# Patient Record
Sex: MALE | Race: WHITE | NOT HISPANIC OR LATINO | Employment: OTHER | URBAN - METROPOLITAN AREA
[De-identification: names, ages, dates, MRNs, and addresses within clinical notes are randomized per-mention and may not be internally consistent; named-entity substitution may affect disease eponyms.]

---

## 2017-01-06 ENCOUNTER — OFFICE VISIT (OUTPATIENT)
Dept: INTERNAL MEDICINE | Facility: CLINIC | Age: 77
End: 2017-01-06
Payer: COMMERCIAL

## 2017-01-06 DIAGNOSIS — Z86.718 HISTORY OF DVT (DEEP VEIN THROMBOSIS): ICD-10-CM

## 2017-01-06 DIAGNOSIS — E11.9 TYPE 2 DIABETES MELLITUS WITHOUT COMPLICATION, WITHOUT LONG-TERM CURRENT USE OF INSULIN: ICD-10-CM

## 2017-01-06 DIAGNOSIS — E78.5 HYPERLIPIDEMIA WITH TARGET LDL LESS THAN 70: ICD-10-CM

## 2017-01-06 DIAGNOSIS — F41.9 ANXIETY: ICD-10-CM

## 2017-01-06 DIAGNOSIS — R41.3 MEMORY LOSS: ICD-10-CM

## 2017-01-06 DIAGNOSIS — I25.10 ASCVD (ARTERIOSCLEROTIC CARDIOVASCULAR DISEASE): Primary | ICD-10-CM

## 2017-01-06 DIAGNOSIS — J44.9 CHRONIC OBSTRUCTIVE PULMONARY DISEASE, UNSPECIFIED COPD TYPE: ICD-10-CM

## 2017-01-06 DIAGNOSIS — I48.91 ATRIAL FIBRILLATION, UNSPECIFIED TYPE: ICD-10-CM

## 2017-01-06 DIAGNOSIS — I10 BENIGN ESSENTIAL HTN: ICD-10-CM

## 2017-01-06 DIAGNOSIS — E03.8 SUBCLINICAL HYPOTHYROIDISM: ICD-10-CM

## 2017-01-06 DIAGNOSIS — Z86.711 HX OF PULMONARY EMBOLUS: ICD-10-CM

## 2017-01-06 DIAGNOSIS — Z74.09 VERY POOR MOBILITY: ICD-10-CM

## 2017-01-06 PROCEDURE — 99999 PR PBB SHADOW E&M-EST. PATIENT-LVL III: CPT | Mod: PBBFAC,,, | Performed by: NURSE PRACTITIONER

## 2017-01-06 PROCEDURE — 99308 SBSQ NF CARE LOW MDM 20: CPT | Mod: ,,, | Performed by: NURSE PRACTITIONER

## 2017-01-10 VITALS — TEMPERATURE: 98 F | DIASTOLIC BLOOD PRESSURE: 78 MMHG | HEART RATE: 71 BPM | SYSTOLIC BLOOD PRESSURE: 136 MMHG

## 2017-01-17 NOTE — PROGRESS NOTES
Mao Morse 1940 Seen at Nursing home today. DNR    Standing orders:  12/6/2016 Na 136, K 4.1, glucose 89, BUN/creat 26/1.1  11/2016 wbc 8, h/h 13/42, plt 217   Na 138, K 4.3, BUN/creat 19/1.1, glucose 120   tsh 3.4   Total chol 128, trig 157, hdl 41, ldl 55   A1C 6.3   PSA 7.4    Chief complaint: Nursing home follow-up    Vitals:    01/10/17 1329   BP: 136/78   Pulse: 71   Temp: 98 °F (36.7 °C)       Past Medical History   Diagnosis Date    Alzheimer disease     Anticoagulant long-term use     Atrial fibrillation     CAD (coronary artery disease)     COPD (chronic obstructive pulmonary disease)     Diabetes mellitus type II     Hyperlipidemia     Hypertension     MI (myocardial infarction)      x 2    Mitral valve disorder        Past Surgical History   Procedure Laterality Date    Knee surgery       left    Spine surgery       c-spine    Coronary stent placement         Review of patient's allergies indicates:  No Known Allergies    Current Outpatient Prescriptions on File Prior to Visit   Medication Sig Dispense Refill    albuterol (PROVENTIL) 2.5 mg /3 mL (0.083 %) nebulizer solution INHALE 1 VIAL PER NEBULIZER EVERY 8 HOURS  99    amlodipine (NORVASC) 5 MG tablet Take 1 tablet (5 mg total) by mouth once daily. 90 tablet 0    carvedilol (COREG) 25 MG tablet Take 1 tablet (25 mg total) by mouth 2 (two) times daily with meals. 180 tablet 0    carvedilol (COREG) 25 MG tablet TAKE ONE TABLET BY MOUTH TWICE DAILY WITH MEALS 180 tablet 0    donepezil (ARICEPT) 10 MG tablet TAKE ONE TABLET BY MOUTH EVERY EVENING 90 tablet 0    glipiZIDE (GLUCOTROL) 5 MG TR24 TAKE ONE TABLET BY MOUTH ONCE A DAY WITH BREAKFAST 90 tablet 0    hydrochlorothiazide (HYDRODIURIL) 25 MG tablet Take 1 tablet (25 mg total) by mouth once daily. 90 tablet 0    hydrochlorothiazide (HYDRODIURIL) 25 MG tablet TAKE ONE TABLET BY MOUTH ONCE A DAY 90 tablet 0    ipratropium (ATROVENT) 0.02 % nebulizer solution INHALE 1 VIAL PER  NEBULIZER EVERY 8 HOURS  99    lisinopril (PRINIVIL,ZESTRIL) 40 MG tablet Take 1 tablet (40 mg total) by mouth once daily. 90 tablet 1    lorazepam (ATIVAN) 0.5 MG tablet TAKE ONE TABLET BY MOUTH TWICE DAILY AS NEEDED FOR ANXIETY 60 tablet 5    MELATONIN ORAL Take 1 capsule by mouth once daily.      metformin (GLUCOPHAGE) 1000 MG tablet Take 1 tablet (1,000 mg total) by mouth 2 (two) times daily with meals. 180 tablet 1    metformin (GLUCOPHAGE) 1000 MG tablet TAKE ONE TABLET BY MOUTH TWICE DAILY WITH MEALS 180 tablet 0    NITROSTAT 0.4 mg SL tablet Place 1 tablet under the tongue as needed.      omeprazole (PRILOSEC) 40 MG capsule Take 1 capsule (40 mg total) by mouth every morning. 90 capsule 0    omeprazole (PRILOSEC) 40 MG capsule TAKE ONE CAPSULE BY MOUTH IN THE MORNING 90 capsule 0    potassium chloride SA (K-DUR,KLOR-CON) 20 MEQ tablet Take 1 tablet (20 mEq total) by mouth once daily. 90 tablet 1    potassium chloride SA (K-DUR,KLOR-CON) 20 MEQ tablet TAKE ONE TABLET BY MOUTH ONCE A DAY 90 tablet 0    pravastatin (PRAVACHOL) 40 MG tablet TAKE ONE TABLET BY MOUTH ONCE A DAY 90 tablet 0    valsartan (DIOVAN) 320 MG tablet Take 320 mg by mouth once daily.  11    XARELTO 20 mg Tab        No current facility-administered medications on file prior to visit.        HPI: 76 y.o. male resident of nursing home.  He is in his room. He has no complaints today. Nurse reports no problems    ROS: Significant for no complaints . Pt denies any chest pain, SOB, bowel or bladder discomfort. Pt reports eating and drinking well. Sleeping well. No complaints of pain.    PE:   APPEARANCE: Well nourished, well developed, in no acute distress.   HEAD: Normocephalic, atraumatic.  CHEST: Lungs clear to auscultation.  CARDIOVASCULAR: irreg/irreg.  ABDOMEN: Bowel sounds normal. Not distended. Soft. No tenderness or masses.  MUSCULOSKELETAL: Unremarkable. No edema  SKIN: Warm and dry.      DX:  1. ASCVD (arteriosclerotic  cardiovascular disease)     2. Anxiety     3. Chronic obstructive pulmonary disease, unspecified COPD type     4. Hyperlipidemia with target LDL less than 70     5. Benign essential HTN     6. Uncontrolled type 2 diabetes mellitus without complication, without long-term current use of insulin     7. Atrial fibrillation, unspecified type     8. Type 2 diabetes mellitus without complication, without long-term current use of insulin     9. Very poor mobility     10. Subclinical hypothyroidism     11. Memory loss     12. Hx of pulmonary embolus     13. History of DVT (deep vein thrombosis)       Past Medical History   Diagnosis Date    Alzheimer disease     Anticoagulant long-term use     Atrial fibrillation     CAD (coronary artery disease)     COPD (chronic obstructive pulmonary disease)     Diabetes mellitus type II     Hyperlipidemia     Hypertension     MI (myocardial infarction)      x 2    Mitral valve disorder        Medical decision making and plan:  F/u PSA  Cont same other meds and orders

## 2017-02-02 ENCOUNTER — OFFICE VISIT (OUTPATIENT)
Dept: INTERNAL MEDICINE | Facility: CLINIC | Age: 77
End: 2017-02-02
Payer: COMMERCIAL

## 2017-02-02 DIAGNOSIS — J44.9 CHRONIC OBSTRUCTIVE PULMONARY DISEASE, UNSPECIFIED COPD TYPE: Primary | ICD-10-CM

## 2017-02-02 PROCEDURE — 99499 UNLISTED E&M SERVICE: CPT | Mod: S$GLB,,, | Performed by: NURSE PRACTITIONER

## 2017-02-15 NOTE — PROGRESS NOTES
Not seen during this visit. He was placed on list to be seen to re qualify for O2 but nurse reports that patient does not use O2 here. POx >95%

## 2017-03-03 ENCOUNTER — OFFICE VISIT (OUTPATIENT)
Dept: INTERNAL MEDICINE | Facility: CLINIC | Age: 77
End: 2017-03-03
Payer: COMMERCIAL

## 2017-03-03 DIAGNOSIS — J44.9 CHRONIC OBSTRUCTIVE PULMONARY DISEASE, UNSPECIFIED COPD TYPE: ICD-10-CM

## 2017-03-03 DIAGNOSIS — Z86.711 HX PULMONARY EMBOLISM: ICD-10-CM

## 2017-03-03 DIAGNOSIS — E78.5 HYPERLIPIDEMIA WITH TARGET LDL LESS THAN 70: ICD-10-CM

## 2017-03-03 DIAGNOSIS — Z74.09 VERY POOR MOBILITY: ICD-10-CM

## 2017-03-03 DIAGNOSIS — I25.10 ASCVD (ARTERIOSCLEROTIC CARDIOVASCULAR DISEASE): Primary | ICD-10-CM

## 2017-03-03 DIAGNOSIS — E11.9 TYPE 2 DIABETES MELLITUS WITHOUT COMPLICATION, WITHOUT LONG-TERM CURRENT USE OF INSULIN: ICD-10-CM

## 2017-03-03 DIAGNOSIS — I10 BENIGN ESSENTIAL HTN: ICD-10-CM

## 2017-03-03 DIAGNOSIS — Z86.718 HISTORY OF DVT (DEEP VEIN THROMBOSIS): ICD-10-CM

## 2017-03-03 DIAGNOSIS — F41.9 ANXIETY: ICD-10-CM

## 2017-03-03 DIAGNOSIS — E03.8 SUBCLINICAL HYPOTHYROIDISM: ICD-10-CM

## 2017-03-03 PROCEDURE — 99309 SBSQ NF CARE MODERATE MDM 30: CPT | Mod: ,,, | Performed by: NURSE PRACTITIONER

## 2017-03-03 PROCEDURE — 99999 PR PBB SHADOW E&M-EST. PATIENT-LVL III: CPT | Mod: PBBFAC,,, | Performed by: NURSE PRACTITIONER

## 2017-03-07 VITALS
HEART RATE: 68 BPM | RESPIRATION RATE: 18 BRPM | TEMPERATURE: 97 F | SYSTOLIC BLOOD PRESSURE: 116 MMHG | DIASTOLIC BLOOD PRESSURE: 63 MMHG

## 2017-03-13 PROBLEM — Z86.711 HX PULMONARY EMBOLISM: Status: ACTIVE | Noted: 2017-03-13

## 2017-03-13 PROBLEM — Z86.718 HISTORY OF DVT (DEEP VEIN THROMBOSIS): Status: ACTIVE | Noted: 2017-03-13

## 2017-03-13 NOTE — PROGRESS NOTES
Mao Morse 1940 Seen at Nursing home today. DNR    Standing orders:  12/6/2016 Na 136, K 4.1, glucose 89, BUN/creat 26/1.1  11/2016 wbc 8, h/h 13/42, plt 217   Na 138, K 4.3, BUN/creat 19/1.1, glucose 120   tsh 3.4   Total chol 128, trig 157, hdl 41, ldl 55   A1C 6.3   PSA 7.4    Chief complaint: Nursing home follow-up    Vitals:    03/07/17 1230   BP: 116/63   Pulse: 68   Resp: 18   Temp: 97.2 °F (36.2 °C)       Past Medical History:   Diagnosis Date    Alzheimer disease     Anticoagulant long-term use     Atrial fibrillation     CAD (coronary artery disease)     COPD (chronic obstructive pulmonary disease)     Diabetes mellitus type II     Hyperlipidemia     Hypertension     MI (myocardial infarction)     x 2    Mitral valve disorder        Past Surgical History:   Procedure Laterality Date    CORONARY STENT PLACEMENT      KNEE SURGERY      left    SPINE SURGERY      c-spine       Review of patient's allergies indicates:  No Known Allergies    Current Outpatient Prescriptions on File Prior to Visit   Medication Sig Dispense Refill    albuterol (PROVENTIL) 2.5 mg /3 mL (0.083 %) nebulizer solution INHALE 1 VIAL PER NEBULIZER EVERY 8 HOURS  99    amlodipine (NORVASC) 5 MG tablet Take 1 tablet (5 mg total) by mouth once daily. 90 tablet 0    carvedilol (COREG) 25 MG tablet Take 1 tablet (25 mg total) by mouth 2 (two) times daily with meals. 180 tablet 0    donepezil (ARICEPT) 10 MG tablet TAKE ONE TABLET BY MOUTH EVERY EVENING 90 tablet 0    glipiZIDE (GLUCOTROL) 5 MG TR24 TAKE ONE TABLET BY MOUTH ONCE A DAY WITH BREAKFAST 90 tablet 0    hydrochlorothiazide (HYDRODIURIL) 25 MG tablet Take 1 tablet (25 mg total) by mouth once daily. 90 tablet 0    ipratropium (ATROVENT) 0.02 % nebulizer solution INHALE 1 VIAL PER NEBULIZER EVERY 8 HOURS  99    lisinopril (PRINIVIL,ZESTRIL) 40 MG tablet Take 1 tablet (40 mg total) by mouth once daily. 90 tablet 1    MELATONIN ORAL Take 1 capsule by mouth once  daily. 3 mg daily      metformin (GLUCOPHAGE) 1000 MG tablet TAKE ONE TABLET BY MOUTH TWICE DAILY WITH MEALS 180 tablet 0    omeprazole (PRILOSEC) 40 MG capsule Take 1 capsule (40 mg total) by mouth every morning. 90 capsule 0    potassium chloride SA (K-DUR,KLOR-CON) 20 MEQ tablet Take 1 tablet (20 mEq total) by mouth once daily. 90 tablet 1    pravastatin (PRAVACHOL) 40 MG tablet TAKE ONE TABLET BY MOUTH ONCE A DAY 90 tablet 0    XARELTO 20 mg Tab 20 mg once daily.       lorazepam (ATIVAN) 0.5 MG tablet TAKE ONE TABLET BY MOUTH TWICE DAILY AS NEEDED FOR ANXIETY 60 tablet 5    NITROSTAT 0.4 mg SL tablet Place 1 tablet under the tongue as needed.      valsartan (DIOVAN) 320 MG tablet Take 320 mg by mouth once daily.  11     No current facility-administered medications on file prior to visit.        HPI: 76 y.o. male resident of nursing home.  He is in his room. He has no complaints today. Nurse reports no problems    He was last seen by [facility] 1/16/2017    Also to note was elevated PSA     ROS: Significant for no complaints . Pt denies any chest pain, SOB, bowel or bladder discomfort. Pt reports eating and drinking well. Sleeping well. No complaints of pain.    PE:   APPEARANCE: Well nourished, well developed, in no acute distress.   HEAD: Normocephalic, atraumatic.  CHEST: Lungs clear to auscultation.  CARDIOVASCULAR: irreg/irreg.  ABDOMEN: Bowel sounds normal. Not distended. Soft. No tenderness or masses.  MUSCULOSKELETAL: Unremarkable. No edema  SKIN: Warm and dry.      DX:  1. ASCVD (arteriosclerotic cardiovascular disease)     2. Subclinical hypothyroidism     3. Very poor mobility     4. Type 2 diabetes mellitus without complication, without long-term current use of insulin     5. Benign essential HTN     6. Hyperlipidemia with target LDL less than 70     7. Chronic obstructive pulmonary disease, unspecified COPD type     8. Anxiety     9. History of DVT (deep vein thrombosis)     10. Hx pulmonary  embolism       Past Medical History:   Diagnosis Date    Alzheimer disease     Anticoagulant long-term use     Atrial fibrillation     CAD (coronary artery disease)     COPD (chronic obstructive pulmonary disease)     Diabetes mellitus type II     Hyperlipidemia     Hypertension     MI (myocardial infarction)     x 2    Mitral valve disorder        Medical decision making and plan:  F/u PSA- recheck ordered  Cont same other meds and orders

## 2017-04-06 ENCOUNTER — TELEPHONE (OUTPATIENT)
Dept: FAMILY MEDICINE | Facility: CLINIC | Age: 77
End: 2017-04-06

## 2017-04-06 NOTE — TELEPHONE ENCOUNTER
Mary calling from [facility]--pt with pulse running in the 50's last few days. Readings on 's desk for review. Please advise.

## 2017-04-27 ENCOUNTER — OFFICE VISIT (OUTPATIENT)
Dept: INTERNAL MEDICINE | Facility: CLINIC | Age: 77
End: 2017-04-27
Payer: COMMERCIAL

## 2017-04-27 VITALS
DIASTOLIC BLOOD PRESSURE: 70 MMHG | HEART RATE: 71 BPM | SYSTOLIC BLOOD PRESSURE: 137 MMHG | TEMPERATURE: 97 F | RESPIRATION RATE: 18 BRPM

## 2017-04-27 DIAGNOSIS — I48.91 ATRIAL FIBRILLATION, UNSPECIFIED TYPE: ICD-10-CM

## 2017-04-27 DIAGNOSIS — R25.1 TREMORS OF NERVOUS SYSTEM: ICD-10-CM

## 2017-04-27 DIAGNOSIS — I26.99 BILATERAL PULMONARY EMBOLISM: ICD-10-CM

## 2017-04-27 DIAGNOSIS — I10 BENIGN ESSENTIAL HTN: ICD-10-CM

## 2017-04-27 DIAGNOSIS — E03.8 SUBCLINICAL HYPOTHYROIDISM: ICD-10-CM

## 2017-04-27 DIAGNOSIS — J44.9 CHRONIC OBSTRUCTIVE PULMONARY DISEASE, UNSPECIFIED COPD TYPE: ICD-10-CM

## 2017-04-27 DIAGNOSIS — I25.10 ASCVD (ARTERIOSCLEROTIC CARDIOVASCULAR DISEASE): Primary | ICD-10-CM

## 2017-04-27 DIAGNOSIS — E78.5 HYPERLIPIDEMIA WITH TARGET LDL LESS THAN 70: ICD-10-CM

## 2017-04-27 DIAGNOSIS — R41.3 MEMORY LOSS: ICD-10-CM

## 2017-04-27 DIAGNOSIS — Z86.718 HISTORY OF DVT (DEEP VEIN THROMBOSIS): ICD-10-CM

## 2017-04-27 DIAGNOSIS — F41.9 ANXIETY: ICD-10-CM

## 2017-04-27 PROCEDURE — 99309 SBSQ NF CARE MODERATE MDM 30: CPT | Mod: ,,, | Performed by: NURSE PRACTITIONER

## 2017-04-27 PROCEDURE — 99999 PR PBB SHADOW E&M-EST. PATIENT-LVL III: CPT | Mod: PBBFAC,,, | Performed by: NURSE PRACTITIONER

## 2017-04-28 NOTE — PROGRESS NOTES
Mao Morse 1940 Seen at Nursing home today. DNR    Standing orders:  3/7/2017 PSA 7.35  12/6/2016 Na 136, K 4.1, glucose 89, BUN/creat 26/1.1  11/2016 wbc 8, h/h 13/42, plt 217   Na 138, K 4.3, BUN/creat 19/1.1, glucose 120   tsh 3.4   Total chol 128, trig 157, hdl 41, ldl 55   A1C 6.3   PSA 7.4    Chief complaint: Nursing home follow-up    Vitals:    04/27/17 1634   BP: 137/70   Pulse: 71   Resp: 18   Temp: 96.8 °F (36 °C)       Past Medical History:   Diagnosis Date    Alzheimer disease     Anticoagulant long-term use     Atrial fibrillation     CAD (coronary artery disease)     COPD (chronic obstructive pulmonary disease)     Diabetes mellitus type II     Hyperlipidemia     Hypertension     MI (myocardial infarction)     x 2    Mitral valve disorder        Past Surgical History:   Procedure Laterality Date    CORONARY STENT PLACEMENT      KNEE SURGERY      left    SPINE SURGERY      c-spine       Review of patient's allergies indicates:  No Known Allergies    Current Outpatient Prescriptions on File Prior to Visit   Medication Sig Dispense Refill    albuterol (PROVENTIL) 2.5 mg /3 mL (0.083 %) nebulizer solution INHALE 1 VIAL PER NEBULIZER EVERY 8 HOURS  99    amlodipine (NORVASC) 5 MG tablet Take 1 tablet (5 mg total) by mouth once daily. 90 tablet 0    carvedilol (COREG) 25 MG tablet Take 1 tablet (25 mg total) by mouth 2 (two) times daily with meals. 180 tablet 0    donepezil (ARICEPT) 10 MG tablet TAKE ONE TABLET BY MOUTH EVERY EVENING 90 tablet 0    glipiZIDE (GLUCOTROL) 5 MG TR24 TAKE ONE TABLET BY MOUTH ONCE A DAY WITH BREAKFAST 90 tablet 0    hydrochlorothiazide (HYDRODIURIL) 25 MG tablet Take 1 tablet (25 mg total) by mouth once daily. 90 tablet 0    ipratropium (ATROVENT) 0.02 % nebulizer solution INHALE 1 VIAL PER NEBULIZER EVERY 8 HOURS  99    lorazepam (ATIVAN) 0.5 MG tablet TAKE ONE TABLET BY MOUTH TWICE DAILY AS NEEDED FOR ANXIETY 60 tablet 5    MELATONIN ORAL Take 1  capsule by mouth once daily. 3 mg daily      metformin (GLUCOPHAGE) 1000 MG tablet TAKE ONE TABLET BY MOUTH TWICE DAILY WITH MEALS 180 tablet 0    NITROSTAT 0.4 mg SL tablet Place 1 tablet under the tongue as needed.      omeprazole (PRILOSEC) 40 MG capsule Take 1 capsule (40 mg total) by mouth every morning. 90 capsule 0    potassium chloride SA (K-DUR,KLOR-CON) 20 MEQ tablet Take 1 tablet (20 mEq total) by mouth once daily. 90 tablet 1    pravastatin (PRAVACHOL) 40 MG tablet TAKE ONE TABLET BY MOUTH ONCE A DAY 90 tablet 0    valsartan (DIOVAN) 320 MG tablet Take 320 mg by mouth once daily.  11     No current facility-administered medications on file prior to visit.        HPI: 76 y.o. male resident of nursing home.  He is in his room. He has no complaints today. Nurse reports no problems. He is on his way to [facility] for holter monitor. Denies palpitations.he does have noted in chart about low HR, coreg was decreased. HE 71 today    Also to note was elevated PSA 7.35 was sent to Dr Westbrook and discussed options. He and family will f/u with PSA and Dr Westbrook in 6 months    ROS: Significant for no complaints . Pt denies any chest pain, SOB, bowel or bladder discomfort. Pt reports eating and drinking well. Sleeping well. No complaints of pain.    PE:   APPEARANCE: Well nourished, well developed, in no acute distress.   HEAD: Normocephalic, atraumatic.  CHEST: Lungs clear to auscultation.  CARDIOVASCULAR: irreg/irreg.  ABDOMEN: Bowel sounds normal. Not distended. Soft. No tenderness or masses.  MUSCULOSKELETAL: Unremarkable. No edema  SKIN: Warm and dry.      DX:  1. ASCVD (arteriosclerotic cardiovascular disease)     2. Bilateral pulmonary embolism     3. Tremors of nervous system     4. Memory loss     5. Anxiety     6. Chronic obstructive pulmonary disease, unspecified COPD type     7. Hyperlipidemia with target LDL less than 70     8. Benign essential HTN     9. Uncontrolled type 2 diabetes mellitus without  complication, without long-term current use of insulin     10. History of DVT (deep vein thrombosis)     11. Atrial fibrillation, unspecified type     12. Subclinical hypothyroidism       Past Medical History:   Diagnosis Date    Alzheimer disease     Anticoagulant long-term use     Atrial fibrillation     CAD (coronary artery disease)     COPD (chronic obstructive pulmonary disease)     Diabetes mellitus type II     Hyperlipidemia     Hypertension     MI (myocardial infarction)     x 2    Mitral valve disorder        Medical decision making and plan:  F/u PSA with Dr Westbrook in 6 months  Valsartan ordered and d/c lisinopril per cardiology bp 137/70   Awaiting appt with Dr soto for leg weakness   Cont same other meds and orders

## 2017-06-20 ENCOUNTER — TELEPHONE (OUTPATIENT)
Dept: INTERNAL MEDICINE | Facility: CLINIC | Age: 77
End: 2017-06-20

## 2017-06-20 RX ORDER — AMIODARONE HYDROCHLORIDE 200 MG/1
200 TABLET ORAL DAILY
COMMUNITY

## 2017-06-20 RX ORDER — CARVEDILOL 12.5 MG/1
12.5 TABLET ORAL 2 TIMES DAILY
Status: ON HOLD | COMMUNITY
End: 2020-02-26 | Stop reason: HOSPADM

## 2017-06-20 RX ORDER — LEVOTHYROXINE SODIUM 25 UG/1
25 TABLET ORAL DAILY
COMMUNITY
End: 2017-09-11 | Stop reason: DRUGHIGH

## 2017-06-20 RX ORDER — LANOLIN ALCOHOL/MO/W.PET/CERES
400 CREAM (GRAM) TOPICAL 2 TIMES DAILY
COMMUNITY

## 2017-06-20 NOTE — TELEPHONE ENCOUNTER
Medication Reconciliation Completed. June 2017    Med Changes:  Coreg 25 mg BID changed to Coreg 12.5 mg BID    Lorazepam 0.5 mg tablet BID for anxiety -Discontinued

## 2017-06-22 ENCOUNTER — OFFICE VISIT (OUTPATIENT)
Dept: INTERNAL MEDICINE | Facility: CLINIC | Age: 77
End: 2017-06-22
Payer: COMMERCIAL

## 2017-06-22 DIAGNOSIS — J44.9 CHRONIC OBSTRUCTIVE PULMONARY DISEASE, UNSPECIFIED COPD TYPE: ICD-10-CM

## 2017-06-22 DIAGNOSIS — I10 BENIGN ESSENTIAL HTN: ICD-10-CM

## 2017-06-22 DIAGNOSIS — Z74.09 VERY POOR MOBILITY: ICD-10-CM

## 2017-06-22 DIAGNOSIS — I48.91 ATRIAL FIBRILLATION, UNSPECIFIED TYPE: ICD-10-CM

## 2017-06-22 DIAGNOSIS — R29.6 RECURRENT FALLS: ICD-10-CM

## 2017-06-22 DIAGNOSIS — E78.5 HYPERLIPIDEMIA WITH TARGET LDL LESS THAN 70: ICD-10-CM

## 2017-06-22 DIAGNOSIS — Z86.711 HX PULMONARY EMBOLISM: ICD-10-CM

## 2017-06-22 DIAGNOSIS — E03.8 SUBCLINICAL HYPOTHYROIDISM: ICD-10-CM

## 2017-06-22 DIAGNOSIS — Z86.718 HISTORY OF DVT (DEEP VEIN THROMBOSIS): ICD-10-CM

## 2017-06-22 DIAGNOSIS — I25.10 ASCVD (ARTERIOSCLEROTIC CARDIOVASCULAR DISEASE): ICD-10-CM

## 2017-06-22 PROCEDURE — 99999 PR PBB SHADOW E&M-EST. PATIENT-LVL III: CPT | Mod: PBBFAC,,, | Performed by: NURSE PRACTITIONER

## 2017-06-22 PROCEDURE — 99309 SBSQ NF CARE MODERATE MDM 30: CPT | Mod: ,,, | Performed by: NURSE PRACTITIONER

## 2017-06-27 VITALS
RESPIRATION RATE: 18 BRPM | DIASTOLIC BLOOD PRESSURE: 67 MMHG | SYSTOLIC BLOOD PRESSURE: 142 MMHG | HEART RATE: 73 BPM | TEMPERATURE: 97 F

## 2017-06-29 NOTE — PROGRESS NOTES
Mao Morse 1940 Seen at Nursing home today. DNR    Standing orders:  6/2017 total chol 115, trig 71, hdl 35, ldl 66   Mag 1.5   PSA 7.24   TSH 10.76   Wbc 8, h/h 12/37, plt 211   Na 138, k 3.8, bun/creat 18/0.9, glucose 72  3/7/2017 PSA 7.35  12/6/2016 Na 136, K 4.1, glucose 89, BUN/creat 26/1.1  11/2016 wbc 8, h/h 13/42, plt 217   Na 138, K 4.3, BUN/creat 19/1.1, glucose 120   tsh 3.4   Total chol 128, trig 157, hdl 41, ldl 55   A1C 6.3   PSA 7.4    Chief complaint: Nursing home follow-up    Vitals:    06/27/17 1036   BP: (!) 142/67   Pulse: 73   Resp: 18   Temp: 97.3 °F (36.3 °C)       Past Medical History:   Diagnosis Date    Alzheimer disease     Anticoagulant long-term use     Atrial fibrillation     CAD (coronary artery disease)     COPD (chronic obstructive pulmonary disease)     Diabetes mellitus type II     Hyperlipidemia     Hypertension     MI (myocardial infarction)     x 2    Mitral valve disorder        Past Surgical History:   Procedure Laterality Date    CORONARY STENT PLACEMENT      KNEE SURGERY      left    SPINE SURGERY      c-spine       Review of patient's allergies indicates:  No Known Allergies    Current Outpatient Prescriptions on File Prior to Visit   Medication Sig Dispense Refill    albuterol (PROVENTIL) 2.5 mg /3 mL (0.083 %) nebulizer solution Content of 1 vial per neb tx every 4 hours PRN SOB/ coughing (give with ipratropium)  99    amiodarone (PACERONE) 200 MG Tab Take 200 mg by mouth once daily.      amlodipine (NORVASC) 5 MG tablet Take 1 tablet (5 mg total) by mouth once daily. 90 tablet 0    carvedilol (COREG) 12.5 MG tablet Take 12.5 mg by mouth 2 (two) times daily. Notify MD if heart rate below 54      donepezil (ARICEPT) 10 MG tablet TAKE ONE TABLET BY MOUTH EVERY EVENING 90 tablet 0    glipiZIDE (GLUCOTROL) 5 MG TR24 TAKE ONE TABLET BY MOUTH ONCE A DAY WITH BREAKFAST 90 tablet 0    hydrochlorothiazide (HYDRODIURIL) 25 MG tablet Take 1 tablet (25 mg  total) by mouth once daily. 90 tablet 0    ipratropium (ATROVENT) 0.02 % nebulizer solution Content of 1 vial per neb tx every 4 hours PRN SOB/ coughing (give with albuterol)  99    levothyroxine (SYNTHROID) 25 MCG tablet Take 25 mcg by mouth once daily.      magnesium oxide (MAG-OX) 400 mg tablet Take 400 mg by mouth once daily.      MELATONIN ORAL Take 1 capsule by mouth every evening. 3 mg daily      metformin (GLUCOPHAGE) 1000 MG tablet TAKE ONE TABLET BY MOUTH TWICE DAILY WITH MEALS 180 tablet 0    NITROSTAT 0.4 mg SL tablet Place 1 tablet under the tongue as needed.      omeprazole (PRILOSEC) 40 MG capsule Take 1 capsule (40 mg total) by mouth every morning. 90 capsule 0    potassium chloride SA (K-DUR,KLOR-CON) 20 MEQ tablet Take 1 tablet (20 mEq total) by mouth once daily. 90 tablet 1    pravastatin (PRAVACHOL) 40 MG tablet TAKE ONE TABLET BY MOUTH ONCE A DAY 90 tablet 0    rivaroxaban (XARELTO) 20 mg Tab Take 20 mg by mouth daily with dinner or evening meal.      valsartan (DIOVAN) 320 MG tablet Take 320 mg by mouth once daily.  11     No current facility-administered medications on file prior to visit.        HPI: 77 y.o. male resident of nursing home.  He is in his room. He has no complaints today.     Mag 1.5  PSA 7.24  TSH 10.76      Also to note was elevated PSA 7.35 was sent to Dr Westbrook and discussed options. He and family will f/u with PSA and Dr Westbrook in 6 months    ROS: Significant for no complaints . Pt denies any chest pain, SOB, bowel or bladder discomfort. Pt reports eating and drinking well. Sleeping well. No complaints of pain.    PE:   APPEARANCE: Well nourished, well developed, in no acute distress.   HEAD: Normocephalic, atraumatic.  CHEST: Lungs clear to auscultation.  CARDIOVASCULAR: irreg/irreg.  ABDOMEN: Bowel sounds normal. Not distended. Soft. No tenderness or masses.  MUSCULOSKELETAL: Unremarkable. No edema  SKIN: Warm and dry.      DX:  1. Uncontrolled type 2 diabetes  mellitus without complication, without long-term current use of insulin     2. Benign essential HTN     3. Hyperlipidemia with target LDL less than 70     4. Chronic obstructive pulmonary disease, unspecified COPD type     5. ASCVD (arteriosclerotic cardiovascular disease)     6. Subclinical hypothyroidism     7. Very poor mobility     8. Recurrent falls     9. Atrial fibrillation, unspecified type     10. History of DVT (deep vein thrombosis)     11. Hx pulmonary embolism       Past Medical History:   Diagnosis Date    Alzheimer disease     Anticoagulant long-term use     Atrial fibrillation     CAD (coronary artery disease)     COPD (chronic obstructive pulmonary disease)     Diabetes mellitus type II     Hyperlipidemia     Hypertension     MI (myocardial infarction)     x 2    Mitral valve disorder        Medical decision making and plan:  F/u PSA with Dr Westbrook in 6 months  F/u [facility] every 6 month  Start mag ox 400mg po q day  Also started synthroid 25mcg daily and repeat tsh 6 weeks  Cont same other meds and orders

## 2017-08-15 ENCOUNTER — OFFICE VISIT (OUTPATIENT)
Dept: INTERNAL MEDICINE | Facility: CLINIC | Age: 77
End: 2017-08-15
Payer: COMMERCIAL

## 2017-08-15 DIAGNOSIS — R25.1 TREMORS OF NERVOUS SYSTEM: ICD-10-CM

## 2017-08-15 DIAGNOSIS — E78.5 HYPERLIPIDEMIA WITH TARGET LDL LESS THAN 70: ICD-10-CM

## 2017-08-15 DIAGNOSIS — I25.10 ASCVD (ARTERIOSCLEROTIC CARDIOVASCULAR DISEASE): Primary | ICD-10-CM

## 2017-08-15 DIAGNOSIS — R41.3 MEMORY LOSS: ICD-10-CM

## 2017-08-15 DIAGNOSIS — Z86.718 HISTORY OF DVT (DEEP VEIN THROMBOSIS): ICD-10-CM

## 2017-08-15 DIAGNOSIS — E11.9 TYPE 2 DIABETES MELLITUS WITHOUT COMPLICATION, WITHOUT LONG-TERM CURRENT USE OF INSULIN: ICD-10-CM

## 2017-08-15 DIAGNOSIS — I26.99 BILATERAL PULMONARY EMBOLISM: ICD-10-CM

## 2017-08-15 DIAGNOSIS — I10 BENIGN ESSENTIAL HTN: ICD-10-CM

## 2017-08-15 DIAGNOSIS — I48.91 ATRIAL FIBRILLATION, UNSPECIFIED TYPE: ICD-10-CM

## 2017-08-15 DIAGNOSIS — J44.9 CHRONIC OBSTRUCTIVE PULMONARY DISEASE, UNSPECIFIED COPD TYPE: ICD-10-CM

## 2017-08-15 DIAGNOSIS — F41.9 ANXIETY: ICD-10-CM

## 2017-08-15 DIAGNOSIS — E03.8 SUBCLINICAL HYPOTHYROIDISM: ICD-10-CM

## 2017-08-15 PROCEDURE — 99999 PR PBB SHADOW E&M-EST. PATIENT-LVL III: CPT | Mod: PBBFAC,,, | Performed by: NURSE PRACTITIONER

## 2017-08-15 PROCEDURE — 99308 SBSQ NF CARE LOW MDM 20: CPT | Mod: ,,, | Performed by: NURSE PRACTITIONER

## 2017-08-22 VITALS — RESPIRATION RATE: 20 BRPM | SYSTOLIC BLOOD PRESSURE: 142 MMHG | DIASTOLIC BLOOD PRESSURE: 67 MMHG | HEART RATE: 70 BPM

## 2017-08-22 NOTE — PROGRESS NOTES
Mao Morse 1940 Seen at Nursing home today. DNR    Standing orders:  7/27/2017 TSH 7.25  6/2017 total chol 115, trig 71, hdl 35, ldl 66   Mag 1.5   PSA 7.24   TSH 10.76 (- added synthroid 25mcg daily)   Wbc 8, h/h 12/37, plt 211   Na 138, k 3.8, bun/creat 18/0.9, glucose 72  3/7/2017 PSA 7.35  12/6/2016 Na 136, K 4.1, glucose 89, BUN/creat 26/1.1  11/2016 wbc 8, h/h 13/42, plt 217   Na 138, K 4.3, BUN/creat 19/1.1, glucose 120   tsh 3.4   Total chol 128, trig 157, hdl 41, ldl 55   A1C 6.3   PSA 7.4    Chief complaint: Nursing home follow-up    Vitals:    08/22/17 1129   BP: (!) 142/67   Pulse: 70   Resp: 20       Past Medical History:   Diagnosis Date    Alzheimer disease     Anticoagulant long-term use     Atrial fibrillation     CAD (coronary artery disease)     COPD (chronic obstructive pulmonary disease)     Diabetes mellitus type II     Hyperlipidemia     Hypertension     MI (myocardial infarction)     x 2    Mitral valve disorder        Past Surgical History:   Procedure Laterality Date    CORONARY STENT PLACEMENT      KNEE SURGERY      left    SPINE SURGERY      c-spine       Review of patient's allergies indicates:  No Known Allergies    Current Outpatient Prescriptions on File Prior to Visit   Medication Sig Dispense Refill    albuterol (PROVENTIL) 2.5 mg /3 mL (0.083 %) nebulizer solution Content of 1 vial per neb tx every 4 hours PRN SOB/ coughing (give with ipratropium)  99    amiodarone (PACERONE) 200 MG Tab Take 200 mg by mouth once daily.      amlodipine (NORVASC) 5 MG tablet Take 1 tablet (5 mg total) by mouth once daily. 90 tablet 0    carvedilol (COREG) 12.5 MG tablet Take 12.5 mg by mouth 2 (two) times daily. Notify MD if heart rate below 54      donepezil (ARICEPT) 10 MG tablet TAKE ONE TABLET BY MOUTH EVERY EVENING 90 tablet 0    glipiZIDE (GLUCOTROL) 5 MG TR24 TAKE ONE TABLET BY MOUTH ONCE A DAY WITH BREAKFAST 90 tablet 0    hydrochlorothiazide (HYDRODIURIL) 25 MG  tablet Take 1 tablet (25 mg total) by mouth once daily. 90 tablet 0    ipratropium (ATROVENT) 0.02 % nebulizer solution Content of 1 vial per neb tx every 4 hours PRN SOB/ coughing (give with albuterol)  99    levothyroxine (SYNTHROID) 25 MCG tablet Take 25 mcg by mouth once daily.      magnesium oxide (MAG-OX) 400 mg tablet Take 400 mg by mouth once daily.      MELATONIN ORAL Take 1 capsule by mouth every evening. 3 mg daily      metformin (GLUCOPHAGE) 1000 MG tablet TAKE ONE TABLET BY MOUTH TWICE DAILY WITH MEALS 180 tablet 0    NITROSTAT 0.4 mg SL tablet Place 1 tablet under the tongue as needed.      omeprazole (PRILOSEC) 40 MG capsule Take 1 capsule (40 mg total) by mouth every morning. 90 capsule 0    potassium chloride SA (K-DUR,KLOR-CON) 20 MEQ tablet Take 1 tablet (20 mEq total) by mouth once daily. 90 tablet 1    pravastatin (PRAVACHOL) 40 MG tablet TAKE ONE TABLET BY MOUTH ONCE A DAY 90 tablet 0    rivaroxaban (XARELTO) 20 mg Tab Take 20 mg by mouth daily with dinner or evening meal.      valsartan (DIOVAN) 320 MG tablet Take 320 mg by mouth once daily.  11     No current facility-administered medications on file prior to visit.        HPI: 77 y.o. male resident of nursing home.  He is in his room. He has no complaints today.     Last he was seen labs revealed elevated tsh and synthroid was started at 25mcg daily, repeat TSH still elevated 7.25    Also recently saw Dr Woods and he referred him to Dr Armando for bilateral lower ext weakness    ROS: Significant for no complaints . Pt denies any chest pain, SOB, bowel or bladder discomfort. Pt reports eating and drinking well. Sleeping well. No complaints of pain.    PE:   APPEARANCE: Well nourished, well developed, in no acute distress.   HEAD: Normocephalic, atraumatic.  CHEST: Lungs clear to auscultation.  CARDIOVASCULAR: irreg/irreg.  ABDOMEN: Bowel sounds normal. Not distended. Soft. No tenderness or masses.  MUSCULOSKELETAL: Unremarkable.  No edema  SKIN: Warm and dry.      DX:  1. ASCVD (arteriosclerotic cardiovascular disease)     2. Bilateral pulmonary embolism     3. Tremors of nervous system     4. Memory loss     5. Anxiety     6. Chronic obstructive pulmonary disease, unspecified COPD type     7. Hyperlipidemia with target LDL less than 70     8. Benign essential HTN     9. Uncontrolled type 2 diabetes mellitus without complication, without long-term current use of insulin     10. History of DVT (deep vein thrombosis)     11. Atrial fibrillation, unspecified type     12. Type 2 diabetes mellitus without complication, without long-term current use of insulin     13. Subclinical hypothyroidism       Past Medical History:   Diagnosis Date    Alzheimer disease     Anticoagulant long-term use     Atrial fibrillation     CAD (coronary artery disease)     COPD (chronic obstructive pulmonary disease)     Diabetes mellitus type II     Hyperlipidemia     Hypertension     MI (myocardial infarction)     x 2    Mitral valve disorder        Medical decision making and plan:  F/u PSA with Dr Westbrook in 6 months  F/u CIS every 6 month  F/u Dr Armando per Dr Woods rec for bilateral lower ext weakness  Also Increased synthroid to 50mcg daily and repeat tsh 6 weeks  Cont same other meds and orders

## 2017-09-11 ENCOUNTER — OFFICE VISIT (OUTPATIENT)
Dept: NEUROLOGY | Facility: CLINIC | Age: 77
End: 2017-09-11
Payer: COMMERCIAL

## 2017-09-11 ENCOUNTER — HOSPITAL ENCOUNTER (OUTPATIENT)
Dept: RADIOLOGY | Facility: HOSPITAL | Age: 77
Discharge: HOME | End: 2017-09-11
Attending: PSYCHIATRY & NEUROLOGY
Payer: COMMERCIAL

## 2017-09-11 VITALS
SYSTOLIC BLOOD PRESSURE: 112 MMHG | DIASTOLIC BLOOD PRESSURE: 68 MMHG | HEIGHT: 71 IN | RESPIRATION RATE: 16 BRPM | HEART RATE: 66 BPM

## 2017-09-11 DIAGNOSIS — M25.551 RIGHT HIP PAIN: ICD-10-CM

## 2017-09-11 DIAGNOSIS — R29.898 WEAKNESS OF BOTH LOWER EXTREMITIES: ICD-10-CM

## 2017-09-11 DIAGNOSIS — R26.9 ABNORMAL GAIT: ICD-10-CM

## 2017-09-11 DIAGNOSIS — R26.9 ABNORMAL GAIT: Primary | ICD-10-CM

## 2017-09-11 DIAGNOSIS — I48.91 ATRIAL FIBRILLATION, UNSPECIFIED TYPE: ICD-10-CM

## 2017-09-11 PROCEDURE — 1126F AMNT PAIN NOTED NONE PRSNT: CPT | Mod: S$GLB,,, | Performed by: PSYCHIATRY & NEUROLOGY

## 2017-09-11 PROCEDURE — 73502 X-RAY EXAM HIP UNI 2-3 VIEWS: CPT | Mod: 26,RT,, | Performed by: RADIOLOGY

## 2017-09-11 PROCEDURE — 3078F DIAST BP <80 MM HG: CPT | Mod: S$GLB,,, | Performed by: PSYCHIATRY & NEUROLOGY

## 2017-09-11 PROCEDURE — 99203 OFFICE O/P NEW LOW 30 MIN: CPT | Mod: S$GLB,,, | Performed by: PSYCHIATRY & NEUROLOGY

## 2017-09-11 PROCEDURE — 72100 X-RAY EXAM L-S SPINE 2/3 VWS: CPT | Mod: 26,,, | Performed by: RADIOLOGY

## 2017-09-11 PROCEDURE — 3074F SYST BP LT 130 MM HG: CPT | Mod: S$GLB,,, | Performed by: PSYCHIATRY & NEUROLOGY

## 2017-09-11 PROCEDURE — 1159F MED LIST DOCD IN RCRD: CPT | Mod: S$GLB,,, | Performed by: PSYCHIATRY & NEUROLOGY

## 2017-09-11 PROCEDURE — 73502 X-RAY EXAM HIP UNI 2-3 VIEWS: CPT | Mod: TC,RT

## 2017-09-11 PROCEDURE — 72100 X-RAY EXAM L-S SPINE 2/3 VWS: CPT | Mod: TC

## 2017-09-11 PROCEDURE — 99999 PR PBB SHADOW E&M-EST. PATIENT-LVL III: CPT | Mod: PBBFAC,,, | Performed by: PSYCHIATRY & NEUROLOGY

## 2017-09-11 RX ORDER — LEVOTHYROXINE SODIUM 50 UG/1
50 TABLET ORAL DAILY
COMMUNITY

## 2017-09-11 NOTE — LETTER
September 11, 2017      Alyssia Byrd, NAWAF  [address] [address]           [facility]  [address] [address]  Phone: [phone]  Fax: [phone]          Patient: Mao Morse   MR Number: [id number]   YOB: 1940   Date of Visit: 9/11/2017       Dear Alyssia Byrd:    Thank you for referring Mao Morse to me for evaluation. Attached you will find relevant portions of my assessment and plan of care.    If you have questions, please do not hesitate to call me. I look forward to following Mao Morse along with you.    Sincerely,    Gray Armando MD    Enclosure  CC:  No Recipients    If you would like to receive this communication electronically, please contact [email or web address] or [phone] to request more information on [facility] access.    For providers and/or their staff who would like to refer a patient to [facility], please contact us through our one-stop-shop provider referral line, [facility] , at [phone].    If you feel you have received this communication in error or would no longer like to receive these types of communications, please e-mail [email or web address]

## 2017-09-11 NOTE — PROGRESS NOTES
Consult from Dr Woods    HPI: Mao Morse is a 77 y.o. male with Alzheimer's disease.  The patient is sent from his cardiologist regarding his leg weakness.   The patient is known to me and was last seen in 2014 for a long history of right arm tremor (post severe head trauma age 8) then with several months of left arm tremor with activities and worse right arm tremor. Prior CVA and history of Alzheimer's reported at that time.  At that time, I thought seroquel use could be worsening or causing his tremor and d/c'ed the medication and was to consider a medication trial for tremor if he did not improve.  He never returned for follow up and has been placed in the nursing home since. He did not complete MRI brain at that time, and no records from his prior CVA were ever sent.   Leg weakness, per the patient started a few years ago. He reports some trauma to the a few spine years ago (in the lower back)- the patient states he fell a few times then. He reports pain in the right lower back which radiates to the right hip. He has no numbness in the legs.   He stopped walking over a year ago.   His memory is good to him.   He states he also has chronic left sided weakness which contributes to his poor gait.   Patient is in Physical therapy for this  He states his tremor is back to baseline. No left hand tremor now  He is now on xeralto for stroke prevention  He states he has independent right hip pain.     ROS      I have reviewed all of this patient's past medical and surgical histories as well as family and social histories and active allergies and medications as documented in the electronic medical record.        Exam:  Gen Appearance, well developed/nourished in no apparent distress  CV: 2+ distal pulses with no edema or swelling  Neuro:  MS: Awake, alert, oriented to place, person, time he knows the exact date situation. Sustains attention. Recent recall only mildly impaired (he can recall going through prior  MRIs)/remote memory intact, Language is full to spontaneous speech/repetition/naming/comprehension. Fund of Knowledge is full  Judgement good   CN: Optic discs are flat with normal vasculature, PERRL, Extraoccular movements and visual fields are full. Normal facial sensation and strength, Hearing symmetric, Tongue and Palate are midline and strong. Shoulder Shrug symmetric and strong.  Motor: Normal bulk, tone, no abnormal movements. 5/5 strength bilateral upper/lower extremities with 1+ reflexes and no clonus  Sensory: symmetric to temp and vibration but may be decreased in the legs due to diabetes. Romberg negative  Cerebellar: Finger-nose,Heal-shin, Rapid alternating movements intact  Gait: good rise from chair but there is deconditioned appearing weakness in the legs    Imagin2016 CT head: No evidence of acute hemorrhage, mass or mass effect. If there is additional clinical concern for acute ischemia, MRI with diffusion-weighted imaging could be obtained.  But note:  Remote right parietal, left frontal and right occipital infarcts are suspected.  Bilateral lacunar infarcts are also noted.   Labs:  TSH, B12, RPR normal      Assessment/Plan: Mao Morse is a 77 y.o. male known to me for Alzheimer's disease, tremor of the right arm since childhood (post trauma) but worsening tremor in the right arm with new left arm tremor in . Patient also has a know prior history of CVA with multiple territory vascular infarcts on imaging due to afib    I recommend:   1. Seroquel was stopped in  and he is back to his right hand tremor only, chronic since trauma.   2. Check xray Lumbar spine and right hip. Patient states he does not want aggressive work up or treatment such as MRI or EMG. He understands we could be missing a treatable lesion. Patient displays good insight and judgement.   3. Leg weakness is likely multi-factorial and may be due to deconditioning, lumbar radicular disease and his prior CVA and head  injury. He could also have an element of neuropathy in the legs.   4. He is in Physical therapy now to help with leg weakness  5. He will continue NOAC per cardiology for stroke prevention given his afib  6. Continue aricept for memory loss  RTC in 4 months- he understand the importance of follow up to ensure no worsening.   CC: Dr Woods  Also cc: [facility]

## 2017-10-10 ENCOUNTER — OFFICE VISIT (OUTPATIENT)
Dept: INTERNAL MEDICINE | Facility: CLINIC | Age: 77
End: 2017-10-10
Payer: COMMERCIAL

## 2017-10-10 VITALS
SYSTOLIC BLOOD PRESSURE: 142 MMHG | TEMPERATURE: 97 F | DIASTOLIC BLOOD PRESSURE: 67 MMHG | RESPIRATION RATE: 20 BRPM | HEART RATE: 72 BPM

## 2017-10-10 DIAGNOSIS — R29.6 RECURRENT FALLS: ICD-10-CM

## 2017-10-10 DIAGNOSIS — Z86.711 HX PULMONARY EMBOLISM: ICD-10-CM

## 2017-10-10 DIAGNOSIS — F41.9 ANXIETY: ICD-10-CM

## 2017-10-10 DIAGNOSIS — R25.1 TREMORS OF NERVOUS SYSTEM: ICD-10-CM

## 2017-10-10 DIAGNOSIS — I25.10 ASCVD (ARTERIOSCLEROTIC CARDIOVASCULAR DISEASE): ICD-10-CM

## 2017-10-10 DIAGNOSIS — I48.91 ATRIAL FIBRILLATION, UNSPECIFIED TYPE: ICD-10-CM

## 2017-10-10 DIAGNOSIS — J44.9 CHRONIC OBSTRUCTIVE PULMONARY DISEASE, UNSPECIFIED COPD TYPE: ICD-10-CM

## 2017-10-10 DIAGNOSIS — I10 BENIGN ESSENTIAL HTN: ICD-10-CM

## 2017-10-10 DIAGNOSIS — Z86.718 HISTORY OF DVT (DEEP VEIN THROMBOSIS): ICD-10-CM

## 2017-10-10 DIAGNOSIS — R41.3 MEMORY LOSS: ICD-10-CM

## 2017-10-10 DIAGNOSIS — E78.5 HYPERLIPIDEMIA WITH TARGET LDL LESS THAN 70: ICD-10-CM

## 2017-10-10 DIAGNOSIS — E03.8 SUBCLINICAL HYPOTHYROIDISM: ICD-10-CM

## 2017-10-10 PROCEDURE — 99999 PR PBB SHADOW E&M-EST. PATIENT-LVL III: CPT | Mod: PBBFAC,,, | Performed by: NURSE PRACTITIONER

## 2017-10-10 PROCEDURE — 99309 SBSQ NF CARE MODERATE MDM 30: CPT | Mod: ,,, | Performed by: NURSE PRACTITIONER

## 2017-10-17 NOTE — PROGRESS NOTES
Mao Morse 1940 Seen at Nursing home today. DNR    Standing orders:  9/1/2017 tsh 4.7 (7/2017- was 7.25 and we increase synthroid from 25 to 50mcg daily)  7/27/2017 TSH 7.25  6/2017 total chol 115, trig 71, hdl 35, ldl 66   Mag 1.5   PSA 7.24   TSH 10.76 (- added synthroid 25mcg daily)   Wbc 8, h/h 12/37, plt 211   Na 138, k 3.8, bun/creat 18/0.9, glucose 72  3/7/2017 PSA 7.35  12/6/2016 Na 136, K 4.1, glucose 89, BUN/creat 26/1.1  11/2016 wbc 8, h/h 13/42, plt 217   Na 138, K 4.3, BUN/creat 19/1.1, glucose 120   tsh 3.4   Total chol 128, trig 157, hdl 41, ldl 55   A1C 6.3   PSA 7.4    Chief complaint: Nursing home follow-up    Vitals:    10/10/17 1329   BP: (!) 142/67   Pulse: 72   Resp: 20   Temp: 96.8 °F (36 °C)       Past Medical History:   Diagnosis Date    Alzheimer disease     Anticoagulant long-term use     Atrial fibrillation     CAD (coronary artery disease)     COPD (chronic obstructive pulmonary disease)     Diabetes mellitus type II     Hyperlipidemia     Hypertension     MI (myocardial infarction)     x 2    Mitral valve disorder        Past Surgical History:   Procedure Laterality Date    CORONARY STENT PLACEMENT      KNEE SURGERY      left    SPINE SURGERY      c-spine       Review of patient's allergies indicates:  No Known Allergies    Current Outpatient Prescriptions on File Prior to Visit   Medication Sig Dispense Refill    albuterol (PROVENTIL) 2.5 mg /3 mL (0.083 %) nebulizer solution Content of 1 vial per neb tx every 4 hours PRN SOB/ coughing (give with ipratropium)  99    amiodarone (PACERONE) 200 MG Tab Take 200 mg by mouth once daily.      amlodipine (NORVASC) 5 MG tablet Take 1 tablet (5 mg total) by mouth once daily. 90 tablet 0    carvedilol (COREG) 12.5 MG tablet Take 12.5 mg by mouth 2 (two) times daily. Notify MD if heart rate below 54      donepezil (ARICEPT) 10 MG tablet TAKE ONE TABLET BY MOUTH EVERY EVENING 90 tablet 0    glipiZIDE (GLUCOTROL) 5 MG TR24  TAKE ONE TABLET BY MOUTH ONCE A DAY WITH BREAKFAST 90 tablet 0    hydrochlorothiazide (HYDRODIURIL) 25 MG tablet Take 1 tablet (25 mg total) by mouth once daily. 90 tablet 0    ipratropium (ATROVENT) 0.02 % nebulizer solution Content of 1 vial per neb tx every 4 hours PRN SOB/ coughing (give with albuterol)  99    levothyroxine (SYNTHROID) 50 MCG tablet Take 50 mcg by mouth once daily.      magnesium oxide (MAG-OX) 400 mg tablet Take 400 mg by mouth once daily.      MELATONIN ORAL Take 1 capsule by mouth every evening. 3 mg daily      metformin (GLUCOPHAGE) 1000 MG tablet TAKE ONE TABLET BY MOUTH TWICE DAILY WITH MEALS 180 tablet 0    NITROSTAT 0.4 mg SL tablet Place 1 tablet under the tongue as needed.      omeprazole (PRILOSEC) 40 MG capsule Take 1 capsule (40 mg total) by mouth every morning. 90 capsule 0    potassium chloride SA (K-DUR,KLOR-CON) 20 MEQ tablet Take 1 tablet (20 mEq total) by mouth once daily. 90 tablet 1    pravastatin (PRAVACHOL) 40 MG tablet TAKE ONE TABLET BY MOUTH ONCE A DAY 90 tablet 0    rivaroxaban (XARELTO) 20 mg Tab Take 20 mg by mouth daily with dinner or evening meal.      valsartan (DIOVAN) 320 MG tablet Take 320 mg by mouth once daily.  11     No current facility-administered medications on file prior to visit.        HPI: 77 y.o. male resident of nursing home.  He is in his room. He has no complaints today except that his room mate takes up most of the spce in his room which makes it difficult for him to get in and out of bed.     Also recently saw Dr Woods and he referred him to Dr Armando for bilateral lower ext weakness 9/11/17 Saw Dr Armando for debility and hip pain.  X rays show advance deg changes of the hip and scoliosis and severe deg changes along with bine spur and disc space narrowing of the lumbar spine. PT was ordered and may refer to ortho if pt would like if no relief with PT.     ROS: Significant for no medical complaints . Pt denies any chest pain,  SOB, bowel or bladder discomfort. Pt reports eating and drinking well. Sleeping well. No complaints of pain.    PE:   APPEARANCE: Well nourished, well developed, in no acute distress.   HEAD: Normocephalic, atraumatic.  CHEST: Lungs clear to auscultation.  CARDIOVASCULAR: irreg/irreg.  ABDOMEN: Bowel sounds normal. Not distended. Soft. No tenderness or masses.  MUSCULOSKELETAL: Unremarkable. No edema  SKIN: Warm and dry.      DX:  1. Uncontrolled type 2 diabetes mellitus without complication, without long-term current use of insulin     2. Benign essential HTN     3. Hyperlipidemia with target LDL less than 70     4. Chronic obstructive pulmonary disease, unspecified COPD type     5. Anxiety     6. Memory loss     7. Tremors of nervous system     8. ASCVD (arteriosclerotic cardiovascular disease)     9. Subclinical hypothyroidism     10. Recurrent falls     11. Atrial fibrillation, unspecified type     12. History of DVT (deep vein thrombosis)     13. Hx pulmonary embolism       Past Medical History:   Diagnosis Date    Alzheimer disease     Anticoagulant long-term use     Atrial fibrillation     CAD (coronary artery disease)     COPD (chronic obstructive pulmonary disease)     Diabetes mellitus type II     Hyperlipidemia     Hypertension     MI (myocardial infarction)     x 2    Mitral valve disorder        Medical decision making and plan:  F/u PSA with Dr Westbrook in 6 months  F/u CIS every 6 month  F/u Dr Armando per Dr Woods rec for bilateral lower ext weakness; Cont with PT  Also Increased synthroid to 50mcg daily and repeat tsh WNL cont current dose of 50mcg daily    Cont same other meds and orders

## 2017-11-29 ENCOUNTER — TELEPHONE (OUTPATIENT)
Dept: FAMILY MEDICINE | Facility: CLINIC | Age: 77
End: 2017-11-29

## 2017-11-29 NOTE — TELEPHONE ENCOUNTER
Spoke to vida.  She stated pt hit his R arm on anothers pts bed and a hematoma popped up with in a few hours.  The next day the arm began to swell up and it is larger than his L arm.  Pt does not complain of any pain  And does not have any fever.  Nurse is calling to see if you can send in some mupirocin ointment, she states his arm looks like it is in the beginning of cellulitis.   Please advise, thank you

## 2017-11-29 NOTE — TELEPHONE ENCOUNTER
----- Message from Taylor Cárdenas MA sent at 2017  3:41 PM CST -----  Contact: Lorraien- Tanesha Morse  MRN: [id number]  : 1940  PCP: Elsa Unger  Home Phone      [phone]  Work Phone      Not on file.  Mobile          [phone]      MESSAGE: aTnesha from [employer] would like to speak to nurse about patient's arm. Please call and advise.  Phone: [phone]

## 2017-11-30 RX ORDER — MUPIROCIN 20 MG/G
OINTMENT TOPICAL 2 TIMES DAILY
Qty: 22 G | Refills: 2 | Status: SHIPPED | OUTPATIENT
Start: 2017-11-30 | End: 2017-12-10

## 2017-12-05 ENCOUNTER — OFFICE VISIT (OUTPATIENT)
Dept: INTERNAL MEDICINE | Facility: CLINIC | Age: 77
End: 2017-12-05
Payer: COMMERCIAL

## 2017-12-05 VITALS
TEMPERATURE: 98 F | SYSTOLIC BLOOD PRESSURE: 136 MMHG | RESPIRATION RATE: 19 BRPM | DIASTOLIC BLOOD PRESSURE: 76 MMHG | HEART RATE: 76 BPM

## 2017-12-05 DIAGNOSIS — E03.8 SUBCLINICAL HYPOTHYROIDISM: ICD-10-CM

## 2017-12-05 DIAGNOSIS — F41.9 ANXIETY: ICD-10-CM

## 2017-12-05 DIAGNOSIS — I10 BENIGN ESSENTIAL HTN: ICD-10-CM

## 2017-12-05 DIAGNOSIS — I25.10 ASCVD (ARTERIOSCLEROTIC CARDIOVASCULAR DISEASE): ICD-10-CM

## 2017-12-05 DIAGNOSIS — I48.91 ATRIAL FIBRILLATION, UNSPECIFIED TYPE: ICD-10-CM

## 2017-12-05 DIAGNOSIS — J44.9 CHRONIC OBSTRUCTIVE PULMONARY DISEASE, UNSPECIFIED COPD TYPE: ICD-10-CM

## 2017-12-05 DIAGNOSIS — E78.5 HYPERLIPIDEMIA WITH TARGET LDL LESS THAN 70: ICD-10-CM

## 2017-12-05 DIAGNOSIS — R25.1 TREMORS OF NERVOUS SYSTEM: ICD-10-CM

## 2017-12-05 PROCEDURE — 99999 PR PBB SHADOW E&M-EST. PATIENT-LVL III: CPT | Mod: PBBFAC,,, | Performed by: NURSE PRACTITIONER

## 2017-12-05 PROCEDURE — 99308 SBSQ NF CARE LOW MDM 20: CPT | Mod: ,,, | Performed by: NURSE PRACTITIONER

## 2018-01-23 ENCOUNTER — OFFICE VISIT (OUTPATIENT)
Dept: INTERNAL MEDICINE | Facility: CLINIC | Age: 78
End: 2018-01-23
Payer: MEDICARE

## 2018-01-23 VITALS — SYSTOLIC BLOOD PRESSURE: 153 MMHG | RESPIRATION RATE: 18 BRPM | DIASTOLIC BLOOD PRESSURE: 62 MMHG | HEART RATE: 75 BPM

## 2018-01-23 DIAGNOSIS — J44.9 CHRONIC OBSTRUCTIVE PULMONARY DISEASE, UNSPECIFIED COPD TYPE: ICD-10-CM

## 2018-01-23 DIAGNOSIS — E78.5 HYPERLIPIDEMIA WITH TARGET LDL LESS THAN 70: ICD-10-CM

## 2018-01-23 DIAGNOSIS — E03.8 SUBCLINICAL HYPOTHYROIDISM: ICD-10-CM

## 2018-01-23 DIAGNOSIS — E11.9 TYPE 2 DIABETES MELLITUS WITHOUT COMPLICATION, WITHOUT LONG-TERM CURRENT USE OF INSULIN: ICD-10-CM

## 2018-01-23 DIAGNOSIS — R29.6 RECURRENT FALLS: ICD-10-CM

## 2018-01-23 DIAGNOSIS — Z74.09 VERY POOR MOBILITY: ICD-10-CM

## 2018-01-23 DIAGNOSIS — R25.1 TREMORS OF NERVOUS SYSTEM: ICD-10-CM

## 2018-01-23 DIAGNOSIS — I10 BENIGN ESSENTIAL HTN: ICD-10-CM

## 2018-01-23 DIAGNOSIS — R41.3 MEMORY LOSS: ICD-10-CM

## 2018-01-23 DIAGNOSIS — I48.91 ATRIAL FIBRILLATION, UNSPECIFIED TYPE: ICD-10-CM

## 2018-01-23 DIAGNOSIS — I25.10 ASCVD (ARTERIOSCLEROTIC CARDIOVASCULAR DISEASE): ICD-10-CM

## 2018-01-23 DIAGNOSIS — F41.9 ANXIETY: ICD-10-CM

## 2018-01-23 PROCEDURE — 99999 PR PBB SHADOW E&M-EST. PATIENT-LVL III: CPT | Mod: PBBFAC,,, | Performed by: NURSE PRACTITIONER

## 2018-01-23 PROCEDURE — 99308 SBSQ NF CARE LOW MDM 20: CPT | Mod: ICN,,, | Performed by: NURSE PRACTITIONER

## 2018-01-23 NOTE — PROGRESS NOTES
Mao Morse 1940 Seen at Nursing home today. DNR    Standing orders:  12/7/17 glucose 82, Na 140, K 4.6, BUN/creat 20.0.9  9/1/2017 tsh 4.7 (7/2017- was 7.25 and we increase synthroid from 25 to 50mcg daily)  7/27/2017 TSH 7.25  6/2017 total chol 115, trig 71, hdl 35, ldl 66   Mag 1.5   PSA 7.24   TSH 10.76 (- added synthroid 25mcg daily)   Wbc 8, h/h 12/37, plt 211   Na 138, k 3.8, bun/creat 18/0.9, glucose 72  3/7/2017 PSA 7.35  12/6/2016 Na 136, K 4.1, glucose 89, BUN/creat 26/1.1  11/2016 wbc 8, h/h 13/42, plt 217   Na 138, K 4.3, BUN/creat 19/1.1, glucose 120   tsh 3.4   Total chol 128, trig 157, hdl 41, ldl 55   A1C 6.3   PSA 7.4    Chief complaint: Nursing home follow-up    Vitals:    01/23/18 1020   BP: (!) 153/62   Pulse: 75   Resp: 18       Past Medical History:   Diagnosis Date    Alzheimer disease     Anticoagulant long-term use     Atrial fibrillation     CAD (coronary artery disease)     COPD (chronic obstructive pulmonary disease)     Diabetes mellitus type II     Hyperlipidemia     Hypertension     MI (myocardial infarction)     x 2    Mitral valve disorder        Past Surgical History:   Procedure Laterality Date    CORONARY STENT PLACEMENT      KNEE SURGERY      left    SPINE SURGERY      c-spine       Review of patient's allergies indicates:  No Known Allergies    Current Outpatient Prescriptions on File Prior to Visit   Medication Sig Dispense Refill    albuterol (PROVENTIL) 2.5 mg /3 mL (0.083 %) nebulizer solution Content of 1 vial per neb tx every 4 hours PRN SOB/ coughing (give with ipratropium)  99    amiodarone (PACERONE) 200 MG Tab Take 200 mg by mouth once daily.      amlodipine (NORVASC) 5 MG tablet Take 1 tablet (5 mg total) by mouth once daily. 90 tablet 0    carvedilol (COREG) 12.5 MG tablet Take 12.5 mg by mouth 2 (two) times daily. Notify MD if heart rate below 54      donepezil (ARICEPT) 10 MG tablet TAKE ONE TABLET BY MOUTH EVERY EVENING 90 tablet 0     glipiZIDE (GLUCOTROL) 5 MG TR24 TAKE ONE TABLET BY MOUTH ONCE A DAY WITH BREAKFAST 90 tablet 0    hydrochlorothiazide (HYDRODIURIL) 25 MG tablet Take 1 tablet (25 mg total) by mouth once daily. 90 tablet 0    ipratropium (ATROVENT) 0.02 % nebulizer solution Content of 1 vial per neb tx every 4 hours PRN SOB/ coughing (give with albuterol)  99    levothyroxine (SYNTHROID) 50 MCG tablet Take 50 mcg by mouth once daily.      magnesium oxide (MAG-OX) 400 mg tablet Take 400 mg by mouth once daily.      MELATONIN ORAL Take 1 capsule by mouth every evening. 3 mg daily      metformin (GLUCOPHAGE) 1000 MG tablet TAKE ONE TABLET BY MOUTH TWICE DAILY WITH MEALS 180 tablet 0    NITROSTAT 0.4 mg SL tablet Place 1 tablet under the tongue as needed.      omeprazole (PRILOSEC) 40 MG capsule Take 1 capsule (40 mg total) by mouth every morning. 90 capsule 0    potassium chloride SA (K-DUR,KLOR-CON) 20 MEQ tablet Take 1 tablet (20 mEq total) by mouth once daily. 90 tablet 1    pravastatin (PRAVACHOL) 40 MG tablet TAKE ONE TABLET BY MOUTH ONCE A DAY 90 tablet 0    rivaroxaban (XARELTO) 20 mg Tab Take 20 mg by mouth daily with dinner or evening meal.      valsartan (DIOVAN) 320 MG tablet Take 320 mg by mouth once daily.  11     No current facility-administered medications on file prior to visit.        HPI: 77 y.o. male resident of nursing home.  He is in his room. He was d/kelly from PT 10/18 but restarted 11/9. He needs this to keep what little independence he has. Evaluated by Dr Armando for his lower ext weakness and this was rec per her. Ortho in future if needed. He also sees Dr Armando every 4 months last seen 9/11/17.     1/10/18 treated with tamiflu    Last saw Dr Westbrook for elevated PSA 10/25 and he rec a prostate biopsy. The nurses tell me this is being scheduled.   12/14 I wrote orders to be sure his f/us were scheduled with Dr Westbrook, CIS joel Armando      ROS: Significant for no complaints . Pt denies any chest  pain, SOB, bowel or bladder discomfort. Pt reports eating and drinking well. Sleeping well. No complaints of pain.    PE:   APPEARANCE: Well nourished, well developed, in no acute distress.   HEAD: Normocephalic, atraumatic.  CHEST: Lungs clear to auscultation.  CARDIOVASCULAR: irreg/irreg.  ABDOMEN: Bowel sounds normal. Not distended. Soft. No tenderness or masses.  MUSCULOSKELETAL: Unremarkable. 1+bilateral edema lower ext  SKIN: Warm and dry.      DX:  1. Uncontrolled type 2 diabetes mellitus without complication, without long-term current use of insulin     2. Benign essential HTN     3. Hyperlipidemia with target LDL less than 70     4. Chronic obstructive pulmonary disease, unspecified COPD type     5. Anxiety     6. Memory loss     7. Tremors of nervous system     8. ASCVD (arteriosclerotic cardiovascular disease)     9. Subclinical hypothyroidism     10. Very poor mobility     11. Recurrent falls     12. Type 2 diabetes mellitus without complication, without long-term current use of insulin     13. Atrial fibrillation, unspecified type       Past Medical History:   Diagnosis Date    Alzheimer disease     Anticoagulant long-term use     Atrial fibrillation     CAD (coronary artery disease)     COPD (chronic obstructive pulmonary disease)     Diabetes mellitus type II     Hyperlipidemia     Hypertension     MI (myocardial infarction)     x 2    Mitral valve disorder        Medical decision making and plan:  F/u  Dr Westbrook for prostate bx  F/u CIS every 6 month  F/u Dr Armando per Dr Woods rec for bilateral lower ext weakness; Cont with PT  Also Increased synthroid to 50mcg daily and repeat tsh WNL cont current dose of 50mcg daily    Cont same other meds and orders

## 2018-02-15 ENCOUNTER — OFFICE VISIT (OUTPATIENT)
Dept: NEUROLOGY | Facility: CLINIC | Age: 78
End: 2018-02-15
Payer: COMMERCIAL

## 2018-02-15 VITALS
RESPIRATION RATE: 16 BRPM | DIASTOLIC BLOOD PRESSURE: 72 MMHG | HEIGHT: 69 IN | HEART RATE: 68 BPM | SYSTOLIC BLOOD PRESSURE: 130 MMHG

## 2018-02-15 DIAGNOSIS — M16.12 OSTEOARTHRITIS OF LEFT HIP, UNSPECIFIED OSTEOARTHRITIS TYPE: ICD-10-CM

## 2018-02-15 DIAGNOSIS — R29.898 LEFT LEG WEAKNESS: ICD-10-CM

## 2018-02-15 DIAGNOSIS — M51.36 LUMBAR DEGENERATIVE DISC DISEASE: ICD-10-CM

## 2018-02-15 PROCEDURE — 99214 OFFICE O/P EST MOD 30 MIN: CPT | Mod: S$GLB,,, | Performed by: NURSE PRACTITIONER

## 2018-02-15 PROCEDURE — 1126F AMNT PAIN NOTED NONE PRSNT: CPT | Mod: S$GLB,,, | Performed by: NURSE PRACTITIONER

## 2018-02-15 PROCEDURE — 99999 PR PBB SHADOW E&M-EST. PATIENT-LVL IV: CPT | Mod: PBBFAC,,, | Performed by: NURSE PRACTITIONER

## 2018-02-15 PROCEDURE — 1159F MED LIST DOCD IN RCRD: CPT | Mod: S$GLB,,, | Performed by: NURSE PRACTITIONER

## 2018-02-15 RX ORDER — METFORMIN HYDROCHLORIDE 1000 MG/1
1000 TABLET ORAL 2 TIMES DAILY WITH MEALS
COMMUNITY
End: 2019-03-12 | Stop reason: SDUPTHER

## 2018-02-15 RX ORDER — GLIPIZIDE 5 MG/1
TABLET ORAL
COMMUNITY
Start: 2018-02-01 | End: 2018-02-15 | Stop reason: SDUPTHER

## 2018-02-15 NOTE — PROGRESS NOTES
HPI: Mao Morse is a 77 y.o. male with Alzheimer's disease, degenerative disc disease of the L-spine, degenerative changes of the left hip, prior CVA, and chronic right hand tremor since childhood, which began after head trauma, as well as a new left hand tremor, which resolved after Seroquel was discontinued.     Patient presents today for a 4 month follow up visit. He completed PT for his left leg weakness, which was overall ineffective. He continues to remain seated in a wheelchair, secondary to weakness, as well as fear of injury. He does not desire further workup of this or treatment.     His memory is overall unchanged. Mood is stable. Appetite is good. He sleeps well at night.     Right hand tremor unchanged. Denies left hand tremor at this time.     ROS      I have reviewed all of this patient's past medical and surgical histories as well as family and social histories and active allergies and medications as documented in the electronic medical record.    Exam:  Gen Appearance, well developed/nourished in no apparent distress  CV: 2+ distal pulses with no edema or swelling  Neuro:  MS: Awake, alert, oriented to place, person, time he knows the exact date situation. Sustains attention. Recent recall only mildly impaired /remote memory intact, Language is full to spontaneous speech/repetition/naming/comprehension. Fund of Knowledge is full  Judgement good   CN: Optic discs are flat with normal vasculature, PERRL, Extraoccular movements and visual fields are full. Normal facial sensation and strength, Hearing symmetric, Tongue and Palate are midline and strong. Shoulder Shrug symmetric and strong.  Motor: Normal bulk, tone, tremor to right hand with action and at rest. 5/5 strength bilateral upper/lower extremities with 1+ reflexes and no clonus  Sensory: symmetric to temp and vibration but may be decreased in the legs due to diabetes. Romberg negative  Cerebellar: Finger-nose,Heal-shin, Rapid alternating  movements intact  Gait: not done-seated in wheelchair.     Imaging:   L-spine X-ray 9/2017:  Again noted is scoliosis and severe multilevel degenerative changes of the thoracolumbar spine.  Multifocal bony spurring and disc space narrowing.  Vacuum disc phenomenon at the T10 and L2 levels.  Similar findings present on the prior study.    There is no evidence of an acute fracture.    Left Hip X-ray 9/2017:  There are advanced degenerative changes of the hips with bony sclerosis, subchondral cystic change and joint space narrowing.    No evidence of fracture, dislocation or other acute osseous abnormality.  Vascular calcifications.    Multilevel degenerative changes and scoliosis of the spine.    6/2016 CT head:   No evidence of acute hemorrhage, mass or mass effect. If there is additional clinical concern for acute ischemia, MRI with diffusion-weighted imaging could be obtained.  But note:  Remote right parietal, left frontal and right occipital infarcts are suspected.  Bilateral lacunar infarcts are also noted.     Labs: 2014 TSH, B12, RPR normal    Assessment/Plan: Mao Morse is a 77 y.o. male known to me for Alzheimer's disease, tremor of the right arm since childhood (post trauma) but worsening tremor in the right arm with new left arm tremor in 2014. Patient also has a know prior history of CVA with multiple territory vascular infarcts on imaging due to afib    I recommend:   1. Leg weakness is likely multi-factorial and may be due to deconditioning, lumbar radicular disease and his prior CVA and head injury. He could also have an element of neuropathy in the legs. He did not respond to PT, and does not wish to proceed with any further evaluation or treatment for this. He was offered a referral to Ortho for the degenerative changes to his left hip, but declined. Patient displays good insight and judgement.   2. Seroquel was stopped in 2014 with resolution of his left hand tremor; he is back to his right hand  tremor only, chronic since childhood trauma.   3. He will continue NOAC per cardiology for stroke prevention given his A-fib  4. Continue aricept for memory loss.    FU 6 months    CC: [facility]

## 2018-05-31 RX ORDER — GLIPIZIDE 5 MG/1
TABLET ORAL
Qty: 30 TABLET | Refills: 0 | Status: SHIPPED | OUTPATIENT
Start: 2018-05-31 | End: 2018-09-13 | Stop reason: SDUPTHER

## 2018-09-13 ENCOUNTER — OFFICE VISIT (OUTPATIENT)
Dept: NEUROLOGY | Facility: CLINIC | Age: 78
End: 2018-09-13
Payer: COMMERCIAL

## 2018-09-13 VITALS
BODY MASS INDEX: 27.28 KG/M2 | HEIGHT: 69 IN | DIASTOLIC BLOOD PRESSURE: 76 MMHG | SYSTOLIC BLOOD PRESSURE: 168 MMHG | RESPIRATION RATE: 14 BRPM | HEART RATE: 68 BPM

## 2018-09-13 DIAGNOSIS — M16.12 OSTEOARTHRITIS OF LEFT HIP, UNSPECIFIED OSTEOARTHRITIS TYPE: ICD-10-CM

## 2018-09-13 DIAGNOSIS — I10 BENIGN ESSENTIAL HTN: ICD-10-CM

## 2018-09-13 DIAGNOSIS — I25.10 ASCVD (ARTERIOSCLEROTIC CARDIOVASCULAR DISEASE): ICD-10-CM

## 2018-09-13 DIAGNOSIS — R25.1 TREMORS OF NERVOUS SYSTEM: ICD-10-CM

## 2018-09-13 DIAGNOSIS — R41.3 MEMORY LOSS: ICD-10-CM

## 2018-09-13 DIAGNOSIS — R26.89 IMBALANCE: ICD-10-CM

## 2018-09-13 DIAGNOSIS — Z86.711 HX PULMONARY EMBOLISM: ICD-10-CM

## 2018-09-13 DIAGNOSIS — R29.898 BILATERAL LEG WEAKNESS: Primary | ICD-10-CM

## 2018-09-13 DIAGNOSIS — R29.6 RECURRENT FALLS: ICD-10-CM

## 2018-09-13 DIAGNOSIS — J44.9 CHRONIC OBSTRUCTIVE PULMONARY DISEASE, UNSPECIFIED COPD TYPE: ICD-10-CM

## 2018-09-13 DIAGNOSIS — E78.5 HYPERLIPIDEMIA WITH TARGET LDL LESS THAN 70: ICD-10-CM

## 2018-09-13 DIAGNOSIS — I48.91 ATRIAL FIBRILLATION, UNSPECIFIED TYPE: ICD-10-CM

## 2018-09-13 DIAGNOSIS — E11.9 TYPE 2 DIABETES MELLITUS WITHOUT COMPLICATION, WITHOUT LONG-TERM CURRENT USE OF INSULIN: ICD-10-CM

## 2018-09-13 DIAGNOSIS — R26.9 GAIT ABNORMALITY: ICD-10-CM

## 2018-09-13 DIAGNOSIS — Z74.09 VERY POOR MOBILITY: ICD-10-CM

## 2018-09-13 DIAGNOSIS — E03.8 SUBCLINICAL HYPOTHYROIDISM: ICD-10-CM

## 2018-09-13 DIAGNOSIS — Z86.718 HISTORY OF DVT (DEEP VEIN THROMBOSIS): ICD-10-CM

## 2018-09-13 PROCEDURE — 99999 PR PBB SHADOW E&M-EST. PATIENT-LVL IV: CPT | Mod: PBBFAC,,, | Performed by: NURSE PRACTITIONER

## 2018-09-13 PROCEDURE — 3077F SYST BP >= 140 MM HG: CPT | Mod: CPTII,S$GLB,, | Performed by: NURSE PRACTITIONER

## 2018-09-13 PROCEDURE — 3078F DIAST BP <80 MM HG: CPT | Mod: CPTII,S$GLB,, | Performed by: NURSE PRACTITIONER

## 2018-09-13 PROCEDURE — 1101F PT FALLS ASSESS-DOCD LE1/YR: CPT | Mod: CPTII,S$GLB,, | Performed by: NURSE PRACTITIONER

## 2018-09-13 PROCEDURE — 99214 OFFICE O/P EST MOD 30 MIN: CPT | Mod: S$GLB,,, | Performed by: NURSE PRACTITIONER

## 2018-09-13 RX ORDER — LOSARTAN POTASSIUM 100 MG/1
100 TABLET ORAL DAILY
Status: ON HOLD | COMMUNITY
End: 2020-02-26 | Stop reason: HOSPADM

## 2018-09-13 NOTE — PROGRESS NOTES
HPI: Mao Morse is a 78 y.o. male with Alzheimer's disease, degenerative disc disease of the L-spine, degenerative changes of the left hip, prior CVA, and chronic right hand tremor since childhood, which began after head trauma, as well as a new left hand tremor, which resolved after Seroquel was discontinued. He has HTN, HLD, COPD, DM II, intrinsic left hip issues, hypothyroidism, and history of DVT and A-fib.     Patient presents today for a routine follow up visit. BP is in 160's today.    He denies any falls, but would like to repeat PT in an attempt to gain more BLE strength, as he is afraid that he will fall if he gets out of his wheelchair.      His memory is overall unchanged. Mood is stable. Appetite is good. He sleeps well at night. No hallucinations.     Right hand tremor unchanged. Denies left hand tremor at this time.     ROS      I have reviewed all of this patient's past medical and surgical histories as well as family and social histories and active allergies and medications as documented in the electronic medical record.    Exam:  Gen Appearance, well developed/nourished in no apparent distress  CV: 2+ distal pulses with no edema or swelling  Neuro:  MS: Awake, alert, oriented to place, person, time he knows the exact date situation. Sustains attention. Recent recall only mildly impaired /remote memory intact, Language is full to spontaneous speech/repetition/naming/comprehension. Fund of Knowledge is full. Able to name current President with slight delay in recall.   Judgement good   CN: Optic discs are flat with normal vasculature, PERRL, Extraoccular movements and visual fields are full. Normal facial sensation and strength, Hearing symmetric, Tongue and Palate are midline and strong. Shoulder Shrug symmetric and strong.  Motor: Normal bulk, tone, tremor to right hand with action and at rest. 5/5 strength bilateral upper/4/5 strength lower extremities (deconditioning) with 1+ reflexes and no  clonus  Sensory: symmetric to temp and vibration but may be decreased in the legs due to diabetes. Romberg negative  Cerebellar: Finger-nose,Heal-shin, Rapid alternating movements intact  Gait: not done-seated in wheelchair.     Imaging:   L-spine X-ray 9/2017:  Again noted is scoliosis and severe multilevel degenerative changes of the thoracolumbar spine.  Multifocal bony spurring and disc space narrowing.  Vacuum disc phenomenon at the T10 and L2 levels.  Similar findings present on the prior study.    There is no evidence of an acute fracture.    Left Hip X-ray 9/2017:  There are advanced degenerative changes of the hips with bony sclerosis, subchondral cystic change and joint space narrowing.    No evidence of fracture, dislocation or other acute osseous abnormality.  Vascular calcifications.    Multilevel degenerative changes and scoliosis of the spine.    6/2016 CT head:   No evidence of acute hemorrhage, mass or mass effect. If there is additional clinical concern for acute ischemia, MRI with diffusion-weighted imaging could be obtained.  But note:  Remote right parietal, left frontal and right occipital infarcts are suspected.  Bilateral lacunar infarcts are also noted.     Labs: 2014 TSH, B12, RPR normal    Assessment/Plan: Mao Morse is a 78 y.o. male known to me for Alzheimer's disease, tremor of the right arm since childhood (post trauma) but worsening tremor in the right arm with new left arm tremor in 2014. Patient also has a know prior history of CVA with multiple territory vascular infarcts on imaging due to afib. He has HTN, HLD, COPD, DM II, intrinsic left hip issues, hypothyroidism, and history of DVT and A-fib.     I recommend:   1.Repeat PT for BLE weakness and gait imbalance. Leg weakness is likely multi-factorial and may be due to deconditioning, lumbar radicular disease and his prior CVA and head injury. He could also have an element of neuropathy in the legs. He did not respond to PT prior,  but would like to repeat at this time. He was offered a referral to Ortho for the degenerative changes to his left hip, but declined. Patient displays good insight and judgement.   2. Seroquel was stopped in 2014 with resolution of his left hand tremor; he is back to his right hand tremor only, chronic since childhood trauma.   3. He will continue NOAC per cardiology for stroke prevention given his A-fib.  4. Continue aricept for memory loss.    FU 6 months    CC: [facility]

## 2018-10-13 ENCOUNTER — HOSPITAL ENCOUNTER (EMERGENCY)
Facility: HOSPITAL | Age: 78
Discharge: HOME | End: 2018-10-13
Attending: SURGERY
Payer: COMMERCIAL

## 2018-10-13 VITALS
RESPIRATION RATE: 18 BRPM | SYSTOLIC BLOOD PRESSURE: 139 MMHG | WEIGHT: 180 LBS | TEMPERATURE: 98 F | OXYGEN SATURATION: 96 % | BODY MASS INDEX: 26.58 KG/M2 | DIASTOLIC BLOOD PRESSURE: 74 MMHG | HEART RATE: 69 BPM
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
## 2018-10-13 DIAGNOSIS — S00.03XA CONTUSION OF SCALP, INITIAL ENCOUNTER: Primary | ICD-10-CM

## 2018-10-13 LAB
ALBUMIN SERPL BCP-MCNC: 3.4 G/DL
ALP SERPL-CCNC: 69 U/L
ALT SERPL W/O P-5'-P-CCNC: 22 U/L
ANION GAP SERPL CALC-SCNC: 12 MMOL/L
AST SERPL-CCNC: 19 U/L
BACTERIA #/AREA URNS HPF: NORMAL /HPF
BASOPHILS # BLD AUTO: 0.05 K/UL
BASOPHILS NFR BLD: 0.5 %
BILIRUB SERPL-MCNC: 0.4 MG/DL
BILIRUB UR QL STRIP: NEGATIVE
BNP SERPL-MCNC: 29 PG/ML
BUN SERPL-MCNC: 18 MG/DL
CALCIUM SERPL-MCNC: 8.7 MG/DL
CHLORIDE SERPL-SCNC: 100 MMOL/L
CK MB SERPL-MCNC: 0.8 NG/ML
CK MB SERPL-RTO: 2.5 %
CK SERPL-CCNC: 32 U/L
CK SERPL-CCNC: 32 U/L
CLARITY UR: CLEAR
CO2 SERPL-SCNC: 23 MMOL/L
COLOR UR: YELLOW
CREAT SERPL-MCNC: 0.9 MG/DL
DIFFERENTIAL METHOD: ABNORMAL
EOSINOPHIL # BLD AUTO: 0.1 K/UL
EOSINOPHIL NFR BLD: 1 %
ERYTHROCYTE [DISTWIDTH] IN BLOOD BY AUTOMATED COUNT: 14 %
EST. GFR  (AFRICAN AMERICAN): >60 ML/MIN/1.73 M^2
EST. GFR  (NON AFRICAN AMERICAN): >60 ML/MIN/1.73 M^2
GLUCOSE SERPL-MCNC: 216 MG/DL
GLUCOSE UR QL STRIP: ABNORMAL
HCT VFR BLD AUTO: 40.7 %
HGB BLD-MCNC: 13.9 G/DL
HGB UR QL STRIP: NEGATIVE
KETONES UR QL STRIP: ABNORMAL
LEUKOCYTE ESTERASE UR QL STRIP: NEGATIVE
LYMPHOCYTES # BLD AUTO: 2.2 K/UL
LYMPHOCYTES NFR BLD: 20.1 %
MCH RBC QN AUTO: 30.3 PG
MCHC RBC AUTO-ENTMCNC: 34.2 G/DL
MCV RBC AUTO: 89 FL
MICROSCOPIC COMMENT: NORMAL
MONOCYTES # BLD AUTO: 0.8 K/UL
MONOCYTES NFR BLD: 7.5 %
NEUTROPHILS # BLD AUTO: 7.7 K/UL
NEUTROPHILS NFR BLD: 70.9 %
NITRITE UR QL STRIP: NEGATIVE
PH UR STRIP: 5 [PH] (ref 5–8)
PLATELET # BLD AUTO: 261 K/UL
PMV BLD AUTO: 9.9 FL
POTASSIUM SERPL-SCNC: 4.4 MMOL/L
PROT SERPL-MCNC: 6.4 G/DL
PROT UR QL STRIP: ABNORMAL
RBC # BLD AUTO: 4.58 M/UL
RBC #/AREA URNS HPF: 0 /HPF (ref 0–4)
SODIUM SERPL-SCNC: 135 MMOL/L
SP GR UR STRIP: >=1.03 (ref 1–1.03)
TROPONIN I SERPL DL<=0.01 NG/ML-MCNC: <0.006 NG/ML
URN SPEC COLLECT METH UR: ABNORMAL
UROBILINOGEN UR STRIP-ACNC: NEGATIVE EU/DL
WBC # BLD AUTO: 10.87 K/UL
WBC #/AREA URNS HPF: 0 /HPF (ref 0–5)
YEAST URNS QL MICRO: NORMAL

## 2018-10-13 PROCEDURE — 93010 ELECTROCARDIOGRAM REPORT: CPT | Mod: ,,, | Performed by: INTERNAL MEDICINE

## 2018-10-13 PROCEDURE — 82553 CREATINE MB FRACTION: CPT

## 2018-10-13 PROCEDURE — 85025 COMPLETE CBC W/AUTO DIFF WBC: CPT

## 2018-10-13 PROCEDURE — 82550 ASSAY OF CK (CPK): CPT

## 2018-10-13 PROCEDURE — 83880 ASSAY OF NATRIURETIC PEPTIDE: CPT

## 2018-10-13 PROCEDURE — 84484 ASSAY OF TROPONIN QUANT: CPT

## 2018-10-13 PROCEDURE — 81000 URINALYSIS NONAUTO W/SCOPE: CPT

## 2018-10-13 PROCEDURE — 93005 ELECTROCARDIOGRAM TRACING: CPT

## 2018-10-13 PROCEDURE — 80053 COMPREHEN METABOLIC PANEL: CPT

## 2018-10-13 PROCEDURE — 99285 EMERGENCY DEPT VISIT HI MDM: CPT

## 2018-10-13 PROCEDURE — 36415 COLL VENOUS BLD VENIPUNCTURE: CPT

## 2018-10-13 RX ORDER — SPIRONOLACTONE 25 MG/1
25 TABLET ORAL DAILY
Status: ON HOLD | COMMUNITY
End: 2020-02-26 | Stop reason: HOSPADM

## 2018-10-13 NOTE — ED NOTES
Pt sitting up in bed eating breakfast, no distress noted, Yehuda RN  At bedside assisting pt with eating , patient denies needs at this time

## 2018-10-13 NOTE — ED NOTES
Received verbal report from DESTINEE Celestin.  Pt in ED room ED 05/ED 05 lying in stretcher, HOB 30 degrees. Stretcher is in low, locked position, side rails up x2.  Pt previously connected to continuous cardiac monitor.  The patient is awake, alert and cooperative with a calm affect, patient is confused about where he is. Airway is open and patent, respirations are spontaneous, normal respiratory effort and rate noted, skin warm and dry, full ROM in all extremities, appearance: NAD noted, resting comfortably.Call bell within reach of pt, pt instructed on use, pt verbalizes understanding of call bell use. Hourly rounding explained and white board updated.  Plan of care:  observe and reassure, position of comfort, respirations even and unlabored, patient offers no complaints at this time, awaiting additional orders, will continue to monitor

## 2018-10-13 NOTE — ED NOTES
Report given to DESTINEE Rosen at the [facility], request for transportation , Jessika Verbalized understanding

## 2018-10-13 NOTE — ED PROVIDER NOTES
[facility]                                                 Chief Complaint  78 y.o. male with Head Injury     History of Present Illness  Mao Morse presents to the emergency room with possible head injury today  Patient was acting funny, following his arms around in his wheelchair this a.m.  Patient then slipped out of the wheelchair and hit his head with no LOC noted  Patient had no seizure activity, EMS states he answered all questions this a.m.  Patient presents with significant dementia, this is his baseline Alzheimer's dz  No obvious neuro deficit, patient actually has no complaint on ER interview  Nursing home states that the patient had a set, minimal hematoma noted now    The history is provided by the patient   device was not used during this ER visit    Past Medical History   -- Alzheimer disease    -- Anticoagulant long-term use    -- Atrial fibrillation    -- CAD (coronary artery disease)    -- COPD (chronic obstructive pulmonary disease)    -- Dementia    -- Diabetes mellitus type II    -- GERD (gastroesophageal reflux disease)    -- Hyperlipidemia    -- Hypertension    -- Hypokalemia    -- Hypomagnesemia    -- Insomnia    -- MI (myocardial infarction)    -- Mitral valve disorder    -- Thyroid disease      Surgeries: Coronary artery stent placement, knee surgery, spine surgery  No Known Allergies   Family history: Father has hypertension    Review of Systems and Physical Exam      Review of Systems  -- Constitution - no fever, denies fatigue, no weakness, no chills  -- Eyes - no tearing or redness, no visual disturbance  -- Ear, Nose - no tinnitus or earache, no nasal congestion or discharge  -- Mouth,Throat - no sore throat, no toothache, normal voice, normal swallowing  -- Respiratory - denies cough and congestion, no shortness of breath, no HELM  -- Cardiovascular - denies chest pain, no palpitations, denies claudication  -- Gastrointestinal - denies  abdominal pain, nausea, vomiting, or diarrhea  -- Genitourinary - no dysuria, denies flank pain, no hematuria, no STD risk  -- Musculoskeletal - denies back pain, negative for myalgias and arthralgias   -- Neurological - headache, denies weakness or seizure; no LOC  -- Skin - denies pallor, rash, or changes in skin. no hives or welts noted  -- Psychiatric - Denies SI or HI, no psychosis or fractured thought noted     Vital Signs  His oral temperature is 96.3 °F (35.7 °C).   His blood pressure is 141/80 and his pulse is 66.   His respiration is 16 and oxygen saturation is 94%.     Physical Exam  -- Nursing note and vitals reviewed  -- Constitutional: Appears well-developed and well-nourished  -- Head:  Small hematoma to the posterior scalp  -- Eyes: Pupils are equal and reactive to light. Normal conjunctiva and lids  -- Nose: Nose normal in appearance, nares grossly normal. No discharge  -- Throat: Mucous membranes moist, pharynx normal, normal tonsils. No lesions   -- Ears: External ears and TM normal bilaterally. Normal hearing and no drainage  -- Neck: Normal range of motion. Neck supple. No masses, trachea midline  -- Cardiac: Normal rate, regular rhythm and normal heart sounds  -- Pulmonary: Normal respiratory effort, breath sounds clear to auscultation  -- Abdominal: Soft, no tenderness. Normal bowel sounds. Normal liver edge  -- Musculoskeletal: Normal range of motion, no effusions. Joints stable   -- Neurological: No focal deficits. Showed good interaction with staff  -- Vascular: Posterior tibial, dorsalis pedis and radial pulses 2+ bilaterally      Emergency Room Course      Treatment and Evaluation  -- The CT of the head performed in the ER today was negative for acute pathology  -- Chest x-ray showed no infiltrate and showed no acute pathology  -- Pelvis x-ray showed no evidence of fracture or dislocation  -- The electrolytes drawn in the ER today were within normal limits  -- The CBC drawn in the ER  today was within normal limits  -- Urinalysis performed during this ER visit showed no signs of infection  -- The EKG findings today were without concerning findings from baseline  -- The troponin drawn in the ER today was within normal limits     Diagnosis  -- The primary encounter diagnosis was Contusion of scalp, initial encounter.   -- A diagnosis of Fall was also pertinent to this visit.    Disposition and Plan  -- Disposition: home  -- Condition: stable  -- Follow-up: Patient to follow up with Elsa Unger NP in 1-2 days.  -- I advised the patient that we have found no life threatening condition today  -- At this time, I believe the patient is clinically stable for discharge.   -- The patient acknowledges that close follow up with a MD is required   -- Patient agrees to comply with all instruction and direction given in the ER    This note is dictated on Dragon Natural Speaking word recognition program.  There are word recognition mistakes that are occasionally missed on review.         Bryan Gambino MD  10/13/18 0733

## 2018-10-13 NOTE — ED NOTES
The patient was seen, evaluated and discharged by Dr Gambino. All questions were asked and/or answered and the pt was discharged with written and verbal instructions.  Discharged to home/self care.    - Condition at discharge: Good  - Mode of Discharge: Ambulatory  - The patient left the ED accompanied by the [facility] staff. Report given to DESTINEE Rosen at the [facility]  - The discharge instructions were discussed with the patient.  - They state an understanding of the discharge instructions.  - Walked pt to the discharge station.

## 2018-10-13 NOTE — ED TRIAGE NOTES
"78 y.o. male presents to ER ED 05/ED 05   Chief Complaint   Patient presents with    Head Injury     the patient from the [facility] with an "episode" of AMS followed by a fall from his wheelchair to the floor, hematoma to the head. No LOC   . No acute distress noted.    "

## 2018-10-13 NOTE — ED NOTES
Hourly Rounding complete. Pt sitting up in bed ,  in room respirations even and unlabored KIMBERLY pt has no needs or complaints at this time bed locked and in lowest position call bell in reach pt instructed to call for needs, family at bedside

## 2019-03-12 ENCOUNTER — OFFICE VISIT (OUTPATIENT)
Dept: NEUROLOGY | Facility: CLINIC | Age: 79
End: 2019-03-12
Payer: COMMERCIAL

## 2019-03-12 VITALS
WEIGHT: 185 LBS | RESPIRATION RATE: 18 BRPM | DIASTOLIC BLOOD PRESSURE: 78 MMHG | BODY MASS INDEX: 27.4 KG/M2 | SYSTOLIC BLOOD PRESSURE: 138 MMHG | HEIGHT: 69 IN | HEART RATE: 72 BPM

## 2019-03-12 DIAGNOSIS — Z86.711 HX PULMONARY EMBOLISM: ICD-10-CM

## 2019-03-12 DIAGNOSIS — Z74.09 VERY POOR MOBILITY: ICD-10-CM

## 2019-03-12 DIAGNOSIS — R29.6 RECURRENT FALLS: ICD-10-CM

## 2019-03-12 DIAGNOSIS — E03.8 SUBCLINICAL HYPOTHYROIDISM: ICD-10-CM

## 2019-03-12 DIAGNOSIS — M16.12 OSTEOARTHRITIS OF LEFT HIP, UNSPECIFIED OSTEOARTHRITIS TYPE: ICD-10-CM

## 2019-03-12 DIAGNOSIS — I10 BENIGN ESSENTIAL HTN: ICD-10-CM

## 2019-03-12 DIAGNOSIS — Z86.718 HISTORY OF DVT (DEEP VEIN THROMBOSIS): ICD-10-CM

## 2019-03-12 DIAGNOSIS — J44.9 CHRONIC OBSTRUCTIVE PULMONARY DISEASE, UNSPECIFIED COPD TYPE: ICD-10-CM

## 2019-03-12 DIAGNOSIS — I48.91 ATRIAL FIBRILLATION, UNSPECIFIED TYPE: ICD-10-CM

## 2019-03-12 DIAGNOSIS — E78.5 HYPERLIPIDEMIA WITH TARGET LDL LESS THAN 70: ICD-10-CM

## 2019-03-12 DIAGNOSIS — R25.1 TREMORS OF NERVOUS SYSTEM: ICD-10-CM

## 2019-03-12 DIAGNOSIS — R41.3 MEMORY LOSS: Primary | ICD-10-CM

## 2019-03-12 DIAGNOSIS — E11.9 TYPE 2 DIABETES MELLITUS WITHOUT COMPLICATION, WITHOUT LONG-TERM CURRENT USE OF INSULIN: ICD-10-CM

## 2019-03-12 PROCEDURE — 99214 OFFICE O/P EST MOD 30 MIN: CPT | Mod: S$GLB,,, | Performed by: NURSE PRACTITIONER

## 2019-03-12 PROCEDURE — 3078F PR MOST RECENT DIASTOLIC BLOOD PRESSURE < 80 MM HG: ICD-10-PCS | Mod: CPTII,S$GLB,, | Performed by: NURSE PRACTITIONER

## 2019-03-12 PROCEDURE — 3075F PR MOST RECENT SYSTOLIC BLOOD PRESS GE 130-139MM HG: ICD-10-PCS | Mod: CPTII,S$GLB,, | Performed by: NURSE PRACTITIONER

## 2019-03-12 PROCEDURE — 3075F SYST BP GE 130 - 139MM HG: CPT | Mod: CPTII,S$GLB,, | Performed by: NURSE PRACTITIONER

## 2019-03-12 PROCEDURE — 99999 PR PBB SHADOW E&M-EST. PATIENT-LVL IV: CPT | Mod: PBBFAC,,, | Performed by: NURSE PRACTITIONER

## 2019-03-12 PROCEDURE — 3078F DIAST BP <80 MM HG: CPT | Mod: CPTII,S$GLB,, | Performed by: NURSE PRACTITIONER

## 2019-03-12 PROCEDURE — 99214 PR OFFICE/OUTPT VISIT, EST, LEVL IV, 30-39 MIN: ICD-10-PCS | Mod: S$GLB,,, | Performed by: NURSE PRACTITIONER

## 2019-03-12 PROCEDURE — 99999 PR PBB SHADOW E&M-EST. PATIENT-LVL IV: ICD-10-PCS | Mod: PBBFAC,,, | Performed by: NURSE PRACTITIONER

## 2019-03-12 RX ORDER — LEVETIRACETAM 500 MG/1
500 TABLET ORAL 2 TIMES DAILY
COMMUNITY
End: 2019-03-12 | Stop reason: ALTCHOICE

## 2019-03-12 NOTE — PROGRESS NOTES
"HPI: Mao Morse is a 78 y.o. male with Alzheimer's disease, degenerative disc disease of the L-spine, degenerative changes of the left hip, prior CVA, and chronic right hand tremor since childhood, which began after head trauma, as well as a new left hand tremor, which resolved after Seroquel was discontinued. He has HTN, HLD, COPD, DM II, intrinsic left hip issues, hypothyroidism, and history of DVT and A-fib.     Patient presents today for a routine follow up visit. He was referred back to PT at his last visit for BLE weakness, which was ineffective. He is non-ambulatory at the nursing home.     He had a CHI in 10/2018 from slipping and falling out of his wheelchair. CT Head per ED showed a right parietal cephalohematoma without underlying bony fracture.    He is currently prescribed Keppra, which is new from his last visit. I contacted the nursing home, who stated that this was started in 10/2018, because of a seizure; however, in reviewing Dr. Gambino's note from 10/13/2018, it specifically states "no seizure activity" and "denies seizure". In the HPI there was mention that patient was "acting funny and had flailing of the arms before he slipped out of his wheelchair". Patient reports to slipping out of his wheelchair, but denies any loss of consciousness or other issues.     His memory is overall unchanged. Mood is stable. Appetite is good. He sleeps well at night. No hallucinations.     Right hand tremor unchanged. Denies left hand tremor at this time.     ROS      I have reviewed all of this patient's past medical and surgical histories as well as family and social histories and active allergies and medications as documented in the electronic medical record.    Exam:  Gen Appearance, well developed/nourished in no apparent distress  CV: 2+ distal pulses with no edema or swelling  Neuro:  MS: Awake, alert, oriented to place, person, time he knows the exact date situation. Sustains attention. Recent recall " only mildly impaired /remote memory intact, Language is full to spontaneous speech/repetition/naming/comprehension. Fund of Knowledge is full. Unable to name current President, but can name what he ate for breakfast.   Judgement good   CN: Optic discs are flat with normal vasculature, PERRL, Extraoccular movements and visual fields are full. Normal facial sensation and strength, Hearing symmetric, Tongue and Palate are midline and strong. Shoulder Shrug symmetric and strong.  Motor: Normal bulk, tone, tremor to right hand with action and at rest. 5/5 strength bilateral upper/4/5 strength lower extremities (deconditioning) with 1+ reflexes and no clonus  Sensory: symmetric to temp and vibration but may be decreased in the legs due to diabetes. Romberg negative  Cerebellar: Finger-nose,Heal-shin, Rapid alternating movements intact  Gait: not done-seated in wheelchair.     Imaging:  10/2018 CT Head:   1. Cortical atrophy with periventricular deep white matter change consistent with chronic small vessel ischemic disease.  2. Small remote lacunar infarct of the left basal ganglia.  3. Chronic left frontal and right occipital encephalomalacia from remote infarctions.  4. Chronic sphenoid sinusitis.  5. Right parietal cephalohematoma without underlying bony fracture.  The preliminary and final reports are concordant.    L-spine X-ray 9/2017:  Again noted is scoliosis and severe multilevel degenerative changes of the thoracolumbar spine.  Multifocal bony spurring and disc space narrowing.  Vacuum disc phenomenon at the T10 and L2 levels.  Similar findings present on the prior study.    There is no evidence of an acute fracture.    Left Hip X-ray 9/2017:  There are advanced degenerative changes of the hips with bony sclerosis, subchondral cystic change and joint space narrowing.    No evidence of fracture, dislocation or other acute osseous abnormality.  Vascular calcifications.    Multilevel degenerative changes and  scoliosis of the spine.    6/2016 CT head:   No evidence of acute hemorrhage, mass or mass effect. If there is additional clinical concern for acute ischemia, MRI with diffusion-weighted imaging could be obtained.  But note:  Remote right parietal, left frontal and right occipital infarcts are suspected.  Bilateral lacunar infarcts are also noted.     Labs: 2014 TSH, B12, RPR normal    Assessment/Plan: Mao Morse is a 78 y.o. male known to me for Alzheimer's disease, tremor of the right arm since childhood (post trauma) but worsening tremor in the right arm with new left arm tremor in 2014. Patient also has a know prior history of CVA with multiple territory vascular infarcts on imaging due to afib. He has HTN, HLD, COPD, DM II, intrinsic left hip issues, hypothyroidism, and history of DVT and A-fib.     I recommend:   1. Wean Keppra at this time, as suspicion for seizure at the time of his slip and fall in 10/2018 is low. The arm movements noted may have been from his right arm tremor. If he should have any other events that are concerning for a seizure, he should be brought to the ER for evaluation, and this clinic should be notified. Dr. Gambino's note clearly stated that there was no seizure activity at the time of his ER visit. Unless there is clear evidence for a seizure, he should not be taking an AED, due to the detrimental effects that it can have on the memory.   2. EEG in one month after Keppra wean to evaluate for cortical irritability.   3. The repeat PT for BLE weakness and gait imbalance was ineffective for a second time. Leg weakness is likely multi-factorial and may be due to deconditioning, lumbar radicular disease and his prior CVA and head injury. He could also have an element of neuropathy in the legs. He was offered a referral to Ortho for the degenerative changes to his left hip, but declined. Patient displays good insight and judgement.   4. Seroquel was stopped in 2014 with resolution of  his left hand tremor; he is back to his right hand tremor only, chronic since childhood trauma.   5. He will continue NOAC per cardiology for stroke prevention given his A-fib.  6. Continue Aricept for memory loss (filled per PCP).     FU 6 months    CC: [facility]

## 2019-09-10 ENCOUNTER — PATIENT OUTREACH (OUTPATIENT)
Dept: ADMINISTRATIVE | Facility: OTHER | Age: 79
End: 2019-09-10

## 2019-09-10 DIAGNOSIS — Z13.5 ENCOUNTER FOR SCREENING FOR DIABETIC RETINOPATHY: Primary | ICD-10-CM

## 2019-09-12 ENCOUNTER — OFFICE VISIT (OUTPATIENT)
Dept: NEUROLOGY | Facility: CLINIC | Age: 79
End: 2019-09-12
Payer: COMMERCIAL

## 2019-09-12 VITALS
WEIGHT: 178 LBS | SYSTOLIC BLOOD PRESSURE: 132 MMHG | HEIGHT: 69 IN | DIASTOLIC BLOOD PRESSURE: 72 MMHG | BODY MASS INDEX: 26.36 KG/M2 | HEART RATE: 64 BPM

## 2019-09-12 DIAGNOSIS — R29.6 RECURRENT FALLS: ICD-10-CM

## 2019-09-12 DIAGNOSIS — E11.9 TYPE 2 DIABETES MELLITUS WITHOUT COMPLICATION, WITHOUT LONG-TERM CURRENT USE OF INSULIN: ICD-10-CM

## 2019-09-12 DIAGNOSIS — J44.9 CHRONIC OBSTRUCTIVE PULMONARY DISEASE, UNSPECIFIED COPD TYPE: ICD-10-CM

## 2019-09-12 DIAGNOSIS — Z86.718 HISTORY OF DVT (DEEP VEIN THROMBOSIS): ICD-10-CM

## 2019-09-12 DIAGNOSIS — E78.5 HYPERLIPIDEMIA WITH TARGET LDL LESS THAN 70: ICD-10-CM

## 2019-09-12 DIAGNOSIS — R41.3 MEMORY LOSS: Primary | ICD-10-CM

## 2019-09-12 DIAGNOSIS — Z74.09 VERY POOR MOBILITY: ICD-10-CM

## 2019-09-12 DIAGNOSIS — Z86.711 HX PULMONARY EMBOLISM: ICD-10-CM

## 2019-09-12 DIAGNOSIS — R29.898 LEFT LEG WEAKNESS: ICD-10-CM

## 2019-09-12 DIAGNOSIS — R25.1 TREMORS OF NERVOUS SYSTEM: ICD-10-CM

## 2019-09-12 DIAGNOSIS — I48.91 ATRIAL FIBRILLATION, UNSPECIFIED TYPE: ICD-10-CM

## 2019-09-12 PROCEDURE — 3075F SYST BP GE 130 - 139MM HG: CPT | Mod: CPTII,S$GLB,, | Performed by: NURSE PRACTITIONER

## 2019-09-12 PROCEDURE — 3078F PR MOST RECENT DIASTOLIC BLOOD PRESSURE < 80 MM HG: ICD-10-PCS | Mod: CPTII,S$GLB,, | Performed by: NURSE PRACTITIONER

## 2019-09-12 PROCEDURE — 99999 PR PBB SHADOW E&M-EST. PATIENT-LVL IV: ICD-10-PCS | Mod: PBBFAC,,, | Performed by: NURSE PRACTITIONER

## 2019-09-12 PROCEDURE — 3078F DIAST BP <80 MM HG: CPT | Mod: CPTII,S$GLB,, | Performed by: NURSE PRACTITIONER

## 2019-09-12 PROCEDURE — 99214 PR OFFICE/OUTPT VISIT, EST, LEVL IV, 30-39 MIN: ICD-10-PCS | Mod: S$GLB,,, | Performed by: NURSE PRACTITIONER

## 2019-09-12 PROCEDURE — 99999 PR PBB SHADOW E&M-EST. PATIENT-LVL IV: CPT | Mod: PBBFAC,,, | Performed by: NURSE PRACTITIONER

## 2019-09-12 PROCEDURE — 99214 OFFICE O/P EST MOD 30 MIN: CPT | Mod: S$GLB,,, | Performed by: NURSE PRACTITIONER

## 2019-09-12 PROCEDURE — 3075F PR MOST RECENT SYSTOLIC BLOOD PRESS GE 130-139MM HG: ICD-10-PCS | Mod: CPTII,S$GLB,, | Performed by: NURSE PRACTITIONER

## 2019-09-12 NOTE — PROGRESS NOTES
HPI: Mao Morse is a 79 y.o. male with Alzheimer's disease, degenerative disc disease of the L-spine, degenerative changes of the left hip, prior CVA, and chronic right hand tremor since childhood, which began after head trauma, as well as a new left hand tremor, which resolved after Seroquel was discontinued. He had a CHI in 10/2018 from slipping and falling out of his wheelchair. CT Head per ED showed a right parietal cephalohematoma without underlying bony fracture. He has HTN, HLD, COPD, DM II, intrinsic left hip issues, hypothyroidism, and history of DVT and A-fib.     Patient presents today for a routine follow up visit. Keppra was weaned at his last visit, as this was started by a nursing home physician after a slip and fall out of his wheelchair, which was treated as a seizure; however, there was no evidence for this. No further falls out of his wheelchair. EEG done after Keppra wean was unremarkable.     He remains non-ambulatory at the nursing home. He transfers from his wheelchair to a commode. He did not respond to PT x 2 prior, and declines further PT, due to fear of falling. He feels safe in his wheelchair.     Memory unchanged from prior visit. Appetite is good. He sleeps well at night. Denies hallucinations. Mood is euthymic.     Right hand tremor unchanged. Denies left hand tremor at this time.     ROS      I have reviewed all of this patient's past medical and surgical histories as well as family and social histories and active allergies and medications as documented in the electronic medical record.    Exam:  Gen Appearance, well developed/nourished in no apparent distress  CV: 2+ distal pulses with no edema or swelling  Neuro:  MS: Awake, alert, oriented to place, person, time he knows the exact date situation. Sustains attention. Recent recall only mildly impaired /remote memory intact, Language is full to spontaneous speech/repetition/naming/comprehension. Fund of Knowledge is full. Able to  name the current President easily without prompting.   Judgement and insight are good.  CN: Optic discs are flat with normal vasculature, PERRL, Extraoccular movements and visual fields are full. Normal facial sensation and strength, Hearing symmetric, Tongue and Palate are midline and strong. Shoulder Shrug symmetric and strong.  Motor: Normal bulk, tone, tremor to right hand with action and at rest. 5/5 strength bilateral upper/4/5 strength lower extremities (deconditioning) with 1+ reflexes and no clonus  Sensory: symmetric to temp and vibration but may be decreased in the legs due to diabetes. Romberg negative  Cerebellar: Finger-nose,Heal-shin, Rapid alternating movements intact  Gait: not done-seated in wheelchair.     Imaging:  10/2018 CT Head:   1. Cortical atrophy with periventricular deep white matter change consistent with chronic small vessel ischemic disease.  2. Small remote lacunar infarct of the left basal ganglia.  3. Chronic left frontal and right occipital encephalomalacia from remote infarctions.  4. Chronic sphenoid sinusitis.  5. Right parietal cephalohematoma without underlying bony fracture.  The preliminary and final reports are concordant.    L-spine X-ray 9/2017:  Again noted is scoliosis and severe multilevel degenerative changes of the thoracolumbar spine.  Multifocal bony spurring and disc space narrowing.  Vacuum disc phenomenon at the T10 and L2 levels.  Similar findings present on the prior study.    There is no evidence of an acute fracture.    Left Hip X-ray 9/2017:  There are advanced degenerative changes of the hips with bony sclerosis, subchondral cystic change and joint space narrowing.    No evidence of fracture, dislocation or other acute osseous abnormality.  Vascular calcifications.    Multilevel degenerative changes and scoliosis of the spine.    6/2016 CT head:   No evidence of acute hemorrhage, mass or mass effect. If there is additional clinical concern for acute  ischemia, MRI with diffusion-weighted imaging could be obtained.  But note:  Remote right parietal, left frontal and right occipital infarcts are suspected.  Bilateral lacunar infarcts are also noted.     Labs: 2014 TSH, B12, RPR normal    4/2019 EEG:  INTERPRETATION:  EEG obtained on this awake and drowsy patient is normal.   Please keep in mind that a normal EEG does not preclude a diagnosis of  epilepsy and clinical correlation is advised.       Assessment/Plan: Mao oMrse is a 79 y.o. male known to me for Alzheimer's disease, tremor of the right arm since childhood (post trauma) but worsening tremor in the right arm with new left arm tremor in 2014. Patient also has a know prior history of CVA with multiple territory vascular infarcts on imaging due to afib. He has HTN, HLD, COPD, DM II, intrinsic left hip issues, hypothyroidism, and history of DVT and A-fib.     I recommend:   1. The repeat PT for BLE weakness and gait imbalance was ineffective for a second time. Leg weakness is likely multi-factorial and may be due to a combination of deconditioning, lumbar radicular disease and his prior CVA and head injury. He could also have an element of neuropathy in the legs. He was offered a referral to Ortho for the degenerative changes to his left hip, but declined. Patient displays good insight and judgement, but declines repeat PT for deconditioning.   2. Keppra weaned at his last visit, as suspicion for seizure at the time of his slip and fall in 10/2018 is very low. Keppra was started by the nursing home physician, and Neuro was not consulted on this. The arm movements noted may have been from his right arm tremor. If he should have any other events that are concerning for a seizure, he should be brought to the ER for evaluation, and this clinic should be notified. Dr. Gambino's note clearly stated that there was no seizure activity at the time of his ER visit. Unless there is clear evidence for a seizure, he  should not be taking an AED, due to the detrimental effects that it can have on the memory.   3. EEG after Keppra wean was normal.   4. Seroquel was stopped in 2014 with resolution of his left hand tremor; he is back to his right hand tremor only, chronic since childhood trauma.   5. He will continue NOAC per cardiology for stroke prevention given his A-fib.  6. Continue Aricept for memory loss (filled per PCP).     FU 1 year    CC: [facility]

## 2020-02-25 ENCOUNTER — HOSPITAL ENCOUNTER (OUTPATIENT)
Facility: HOSPITAL | Age: 80
Discharge: HOME | End: 2020-02-26
Attending: SURGERY | Admitting: FAMILY MEDICINE
Payer: COMMERCIAL

## 2020-02-25 DIAGNOSIS — R53.1 WEAKNESS: ICD-10-CM

## 2020-02-25 DIAGNOSIS — E86.1 HYPOTENSION DUE TO HYPOVOLEMIA: ICD-10-CM

## 2020-02-25 DIAGNOSIS — R07.9 CHEST PAIN: ICD-10-CM

## 2020-02-25 DIAGNOSIS — I95.9 HYPOTENSION: ICD-10-CM

## 2020-02-25 DIAGNOSIS — I25.10 CARDIOVASCULAR DISEASE: ICD-10-CM

## 2020-02-25 LAB
ABO + RH BLD: NORMAL
ALBUMIN SERPL BCP-MCNC: 2.6 G/DL (ref 3.5–5.2)
ALP SERPL-CCNC: 59 U/L (ref 55–135)
ALT SERPL W/O P-5'-P-CCNC: 13 U/L (ref 10–44)
AMPHET+METHAMPHET UR QL: NEGATIVE
ANION GAP SERPL CALC-SCNC: 14 MMOL/L (ref 8–16)
APTT BLDCRRT: 35 SEC (ref 21–32)
AST SERPL-CCNC: 11 U/L (ref 10–40)
BACTERIA #/AREA URNS HPF: ABNORMAL /HPF
BARBITURATES UR QL SCN>200 NG/ML: NEGATIVE
BASOPHILS # BLD AUTO: 0.05 K/UL (ref 0–0.2)
BASOPHILS NFR BLD: 0.6 % (ref 0–1.9)
BENZODIAZ UR QL SCN>200 NG/ML: NEGATIVE
BILIRUB SERPL-MCNC: 0.3 MG/DL (ref 0.1–1)
BILIRUB UR QL STRIP: ABNORMAL
BLD GP AB SCN CELLS X3 SERPL QL: NORMAL
BNP SERPL-MCNC: 40 PG/ML (ref 0–99)
BUN SERPL-MCNC: 50 MG/DL (ref 8–23)
BZE UR QL SCN: NEGATIVE
CALCIUM SERPL-MCNC: 8 MG/DL (ref 8.7–10.5)
CANNABINOIDS UR QL SCN: NEGATIVE
CHLORIDE SERPL-SCNC: 109 MMOL/L (ref 95–110)
CK MB SERPL-MCNC: 0.9 NG/ML (ref 0.1–6.5)
CK MB SERPL-MCNC: 1.3 NG/ML (ref 0.1–6.5)
CK MB SERPL-RTO: 3.5 % (ref 0–5)
CK MB SERPL-RTO: 3.8 % (ref 0–5)
CK SERPL-CCNC: 26 U/L (ref 20–200)
CK SERPL-CCNC: 26 U/L (ref 20–200)
CK SERPL-CCNC: 34 U/L (ref 20–200)
CK SERPL-CCNC: 34 U/L (ref 20–200)
CLARITY UR: ABNORMAL
CO2 SERPL-SCNC: 18 MMOL/L (ref 23–29)
COLOR UR: YELLOW
CREAT SERPL-MCNC: 1.5 MG/DL (ref 0.5–1.4)
CREAT UR-MCNC: 155.9 MG/DL (ref 23–375)
D DIMER PPP IA.FEU-MCNC: 0.6 MG/L FEU
DIFFERENTIAL METHOD: ABNORMAL
EOSINOPHIL # BLD AUTO: 0.1 K/UL (ref 0–0.5)
EOSINOPHIL NFR BLD: 0.8 % (ref 0–8)
ERYTHROCYTE [DISTWIDTH] IN BLOOD BY AUTOMATED COUNT: 15.9 % (ref 11.5–14.5)
EST. GFR  (AFRICAN AMERICAN): 50 ML/MIN/1.73 M^2
EST. GFR  (NON AFRICAN AMERICAN): 43 ML/MIN/1.73 M^2
GLUCOSE SERPL-MCNC: 99 MG/DL (ref 70–110)
GLUCOSE UR QL STRIP: NEGATIVE
GRAN CASTS #/AREA URNS LPF: 1 /LPF
HCT VFR BLD AUTO: 34 % (ref 40–54)
HCT VFR BLD AUTO: 34.2 % (ref 40–54)
HGB BLD-MCNC: 10.7 G/DL (ref 14–18)
HGB BLD-MCNC: 10.8 G/DL (ref 14–18)
HGB UR QL STRIP: ABNORMAL
HYALINE CASTS #/AREA URNS LPF: 5 /LPF
IMM GRANULOCYTES # BLD AUTO: 0.05 K/UL (ref 0–0.04)
IMM GRANULOCYTES NFR BLD AUTO: 0.6 % (ref 0–0.5)
INFLUENZA A, MOLECULAR: NEGATIVE
INFLUENZA B, MOLECULAR: NEGATIVE
INR PPP: 1.3 (ref 0.8–1.2)
KETONES UR QL STRIP: ABNORMAL
LACTATE SERPL-SCNC: 3.3 MMOL/L (ref 0.5–2.2)
LACTATE SERPL-SCNC: 4.3 MMOL/L (ref 0.5–2.2)
LACTATE SERPL-SCNC: 6.5 MMOL/L (ref 0.5–2.2)
LEUKOCYTE ESTERASE UR QL STRIP: NEGATIVE
LYMPHOCYTES # BLD AUTO: 1.2 K/UL (ref 1–4.8)
LYMPHOCYTES NFR BLD: 15.1 % (ref 18–48)
MAGNESIUM SERPL-MCNC: 1.9 MG/DL (ref 1.6–2.6)
MCH RBC QN AUTO: 29.8 PG (ref 27–31)
MCHC RBC AUTO-ENTMCNC: 31.5 G/DL (ref 32–36)
MCV RBC AUTO: 95 FL (ref 82–98)
METHADONE UR QL SCN>300 NG/ML: NEGATIVE
MICROSCOPIC COMMENT: ABNORMAL
MONOCYTES # BLD AUTO: 0.7 K/UL (ref 0.3–1)
MONOCYTES NFR BLD: 9.2 % (ref 4–15)
NEUTROPHILS # BLD AUTO: 5.7 K/UL (ref 1.8–7.7)
NEUTROPHILS NFR BLD: 73.7 % (ref 38–73)
NITRITE UR QL STRIP: NEGATIVE
NRBC BLD-RTO: 0 /100 WBC
OB PNL STL: NEGATIVE
OPIATES UR QL SCN: NEGATIVE
PCP UR QL SCN>25 NG/ML: NEGATIVE
PH UR STRIP: 6 [PH] (ref 5–8)
PHOSPHATE SERPL-MCNC: 4.5 MG/DL (ref 2.7–4.5)
PLATELET # BLD AUTO: 270 K/UL (ref 150–350)
PMV BLD AUTO: 10.7 FL (ref 9.2–12.9)
POCT GLUCOSE: 126 MG/DL (ref 70–110)
POTASSIUM SERPL-SCNC: 5.1 MMOL/L (ref 3.5–5.1)
PROCALCITONIN SERPL IA-MCNC: 0.12 NG/ML
PROT SERPL-MCNC: 5.8 G/DL (ref 6–8.4)
PROT UR QL STRIP: ABNORMAL
PROTHROMBIN TIME: 13.4 SEC (ref 9–12.5)
RBC # BLD AUTO: 3.59 M/UL (ref 4.6–6.2)
RBC #/AREA URNS HPF: >100 /HPF (ref 0–4)
SODIUM SERPL-SCNC: 141 MMOL/L (ref 136–145)
SP GR UR STRIP: 1.02 (ref 1–1.03)
SPECIMEN SOURCE: NORMAL
TOXICOLOGY INFORMATION: NORMAL
TROPONIN I SERPL DL<=0.01 NG/ML-MCNC: <0.006 NG/ML (ref 0–0.03)
URN SPEC COLLECT METH UR: ABNORMAL
UROBILINOGEN UR STRIP-ACNC: 1 EU/DL
WBC # BLD AUTO: 7.75 K/UL (ref 3.9–12.7)
WBC #/AREA URNS HPF: 2 /HPF (ref 0–5)

## 2020-02-25 PROCEDURE — 84484 ASSAY OF TROPONIN QUANT: CPT | Mod: 91

## 2020-02-25 PROCEDURE — 85014 HEMATOCRIT: CPT

## 2020-02-25 PROCEDURE — 63600175 PHARM REV CODE 636 W HCPCS: Performed by: SURGERY

## 2020-02-25 PROCEDURE — 80307 DRUG TEST PRSMV CHEM ANLYZR: CPT

## 2020-02-25 PROCEDURE — 25500020 PHARM REV CODE 255: Performed by: SURGERY

## 2020-02-25 PROCEDURE — 83880 ASSAY OF NATRIURETIC PEPTIDE: CPT

## 2020-02-25 PROCEDURE — G0378 HOSPITAL OBSERVATION PER HR: HCPCS

## 2020-02-25 PROCEDURE — 87502 INFLUENZA DNA AMP PROBE: CPT

## 2020-02-25 PROCEDURE — 96375 TX/PRO/DX INJ NEW DRUG ADDON: CPT

## 2020-02-25 PROCEDURE — 84100 ASSAY OF PHOSPHORUS: CPT

## 2020-02-25 PROCEDURE — 83036 HEMOGLOBIN GLYCOSYLATED A1C: CPT

## 2020-02-25 PROCEDURE — 83605 ASSAY OF LACTIC ACID: CPT | Mod: 91

## 2020-02-25 PROCEDURE — 93005 ELECTROCARDIOGRAM TRACING: CPT

## 2020-02-25 PROCEDURE — 27000221 HC OXYGEN, UP TO 24 HOURS

## 2020-02-25 PROCEDURE — 85730 THROMBOPLASTIN TIME PARTIAL: CPT

## 2020-02-25 PROCEDURE — 86901 BLOOD TYPING SEROLOGIC RH(D): CPT

## 2020-02-25 PROCEDURE — 96361 HYDRATE IV INFUSION ADD-ON: CPT

## 2020-02-25 PROCEDURE — 94760 N-INVAS EAR/PLS OXIMETRY 1: CPT

## 2020-02-25 PROCEDURE — 93010 ELECTROCARDIOGRAM REPORT: CPT | Mod: 76,,, | Performed by: INTERNAL MEDICINE

## 2020-02-25 PROCEDURE — 96366 THER/PROPH/DIAG IV INF ADDON: CPT

## 2020-02-25 PROCEDURE — 82272 OCCULT BLD FECES 1-3 TESTS: CPT

## 2020-02-25 PROCEDURE — 80053 COMPREHEN METABOLIC PANEL: CPT

## 2020-02-25 PROCEDURE — 96365 THER/PROPH/DIAG IV INF INIT: CPT

## 2020-02-25 PROCEDURE — 82550 ASSAY OF CK (CPK): CPT

## 2020-02-25 PROCEDURE — 83735 ASSAY OF MAGNESIUM: CPT

## 2020-02-25 PROCEDURE — 85025 COMPLETE CBC W/AUTO DIFF WBC: CPT

## 2020-02-25 PROCEDURE — 93010 EKG 12-LEAD: ICD-10-PCS | Mod: 76,,, | Performed by: INTERNAL MEDICINE

## 2020-02-25 PROCEDURE — 36415 COLL VENOUS BLD VENIPUNCTURE: CPT

## 2020-02-25 PROCEDURE — 87040 BLOOD CULTURE FOR BACTERIA: CPT | Mod: 59

## 2020-02-25 PROCEDURE — 85018 HEMOGLOBIN: CPT | Mod: 91

## 2020-02-25 PROCEDURE — 81000 URINALYSIS NONAUTO W/SCOPE: CPT | Mod: 59

## 2020-02-25 PROCEDURE — 82553 CREATINE MB FRACTION: CPT

## 2020-02-25 PROCEDURE — 85379 FIBRIN DEGRADATION QUANT: CPT

## 2020-02-25 PROCEDURE — 63600175 PHARM REV CODE 636 W HCPCS: Performed by: NURSE PRACTITIONER

## 2020-02-25 PROCEDURE — 84145 PROCALCITONIN (PCT): CPT

## 2020-02-25 PROCEDURE — 85610 PROTHROMBIN TIME: CPT

## 2020-02-25 PROCEDURE — 25000003 PHARM REV CODE 250: Performed by: SURGERY

## 2020-02-25 PROCEDURE — 99285 EMERGENCY DEPT VISIT HI MDM: CPT | Mod: 25

## 2020-02-25 RX ORDER — SODIUM CHLORIDE 9 MG/ML
1000 INJECTION, SOLUTION INTRAVENOUS
Status: COMPLETED | OUTPATIENT
Start: 2020-02-25 | End: 2020-02-25

## 2020-02-25 RX ORDER — SODIUM CHLORIDE 9 MG/ML
INJECTION, SOLUTION INTRAVENOUS CONTINUOUS
Status: DISCONTINUED | OUTPATIENT
Start: 2020-02-25 | End: 2020-02-26 | Stop reason: HOSPADM

## 2020-02-25 RX ORDER — ONDANSETRON 2 MG/ML
4 INJECTION INTRAMUSCULAR; INTRAVENOUS EVERY 8 HOURS PRN
Status: DISCONTINUED | OUTPATIENT
Start: 2020-02-25 | End: 2020-02-26 | Stop reason: HOSPADM

## 2020-02-25 RX ORDER — PANTOPRAZOLE SODIUM 40 MG/1
40 TABLET, DELAYED RELEASE ORAL DAILY
Status: DISCONTINUED | OUTPATIENT
Start: 2020-02-26 | End: 2020-02-26 | Stop reason: HOSPADM

## 2020-02-25 RX ORDER — ACETAMINOPHEN 325 MG/1
650 TABLET ORAL EVERY 8 HOURS PRN
Status: DISCONTINUED | OUTPATIENT
Start: 2020-02-25 | End: 2020-02-26 | Stop reason: HOSPADM

## 2020-02-25 RX ORDER — SODIUM CHLORIDE 0.9 % (FLUSH) 0.9 %
10 SYRINGE (ML) INJECTION
Status: DISCONTINUED | OUTPATIENT
Start: 2020-02-25 | End: 2020-02-26 | Stop reason: HOSPADM

## 2020-02-25 RX ORDER — ONDANSETRON 2 MG/ML
4 INJECTION INTRAMUSCULAR; INTRAVENOUS
Status: COMPLETED | OUTPATIENT
Start: 2020-02-25 | End: 2020-02-25

## 2020-02-25 RX ADMIN — SODIUM CHLORIDE 1000 ML: 0.9 INJECTION, SOLUTION INTRAVENOUS at 04:02

## 2020-02-25 RX ADMIN — ONDANSETRON 4 MG: 2 INJECTION INTRAMUSCULAR; INTRAVENOUS at 02:02

## 2020-02-25 RX ADMIN — SODIUM CHLORIDE 1000 ML: 0.9 INJECTION, SOLUTION INTRAVENOUS at 02:02

## 2020-02-25 RX ADMIN — PIPERACILLIN AND TAZOBACTAM 4.5 G: 4; .5 INJECTION, POWDER, LYOPHILIZED, FOR SOLUTION INTRAVENOUS; PARENTERAL at 04:02

## 2020-02-25 RX ADMIN — VANCOMYCIN HYDROCHLORIDE 1500 MG: 1.5 INJECTION, POWDER, LYOPHILIZED, FOR SOLUTION INTRAVENOUS at 05:02

## 2020-02-25 RX ADMIN — IOHEXOL 75 ML: 350 INJECTION, SOLUTION INTRAVENOUS at 04:02

## 2020-02-25 RX ADMIN — ACETAMINOPHEN 650 MG: 325 TABLET ORAL at 10:02

## 2020-02-25 RX ADMIN — SODIUM CHLORIDE: 0.9 INJECTION, SOLUTION INTRAVENOUS at 09:02

## 2020-02-25 NOTE — ED PROVIDER NOTES
Encounter Date: 2/25/2020       History     Chief Complaint   Patient presents with    Hypotension    Emesis     Mao Morse is a 79 y.o. Male with PMH of alzheimer disease, Atrial fibrillation (on xarelto), CAD, COPD, DMII, GERD, Hyperlipidemia, HTN, hypokalemia, hypomagnesemia, MI (x2), Thyroid disease who presents to the ED per EMS personnel with reports of hypotension with vomiting.   Per EMS reports, patient was on toilet for BM when he became weak and hypotensive. Upon arrival, B/P was noted to be 60's/40's. En route to hospital patient began vomiting brown emesis; denies coffee ground. He received approximately 400cc IV fluids PTA and presents to the ED alert, pale, B/P 90's/50's, junctional rhythm noted.   He is oriented to his surroundings and anwers questions appropriately.     The history is provided by the EMS personnel.     Review of patient's allergies indicates:  No Known Allergies  Past Medical History:   Diagnosis Date    Alzheimer disease     Anticoagulant long-term use     Atrial fibrillation     CAD (coronary artery disease)     COPD (chronic obstructive pulmonary disease)     Dementia     Diabetes mellitus type II     GERD (gastroesophageal reflux disease)     Hyperlipidemia     Hypertension     Hypokalemia     Hypomagnesemia     Insomnia     MI (myocardial infarction)     x 2    Mitral valve disorder     Thyroid disease      Past Surgical History:   Procedure Laterality Date    CORONARY STENT PLACEMENT      KNEE SURGERY      left    SPINE SURGERY      c-spine     Family History   Problem Relation Age of Onset    Hypertension Father      Social History     Tobacco Use    Smoking status: Never Smoker    Smokeless tobacco: Never Used   Substance Use Topics    Alcohol use: No    Drug use: No     Review of Systems   Constitutional: Negative.  Negative for appetite change, chills and fever.   HENT: Negative.  Negative for congestion, ear discharge, ear pain, postnasal drip,  rhinorrhea and sore throat.    Eyes: Negative.    Respiratory: Negative.  Negative for cough, chest tightness and shortness of breath.    Cardiovascular: Negative.  Negative for chest pain.   Gastrointestinal: Positive for nausea and vomiting. Negative for abdominal distention and abdominal pain (denies abdominal pain).   Endocrine: Negative.    Genitourinary: Negative.  Negative for dysuria, flank pain, hematuria and urgency.   Musculoskeletal: Negative.  Negative for arthralgias and back pain.   Skin: Positive for color change. Negative for rash.   Allergic/Immunologic: Negative.    Neurological: Positive for weakness. Negative for dizziness, numbness and headaches.   Hematological: Negative.  Does not bruise/bleed easily.   Psychiatric/Behavioral: Negative.        Physical Exam     Initial Vitals   BP Pulse Resp Temp SpO2   02/25/20 1331 02/25/20 1331 02/25/20 1331 02/25/20 1410 02/25/20 1331   115/86 (!) 55 16 96.9 °F (36.1 °C) 100 %      MAP       --                Physical Exam    Nursing note and vitals reviewed.  Constitutional: He appears well-developed and well-nourished.   HENT:   Head: Normocephalic and atraumatic.   Eyes: Conjunctivae and EOM are normal. Pupils are equal, round, and reactive to light.   Pupils sluggish; pinpoint.  Growth just below right eye   Neck: Neck supple.   Cardiovascular: Normal rate, regular rhythm, normal heart sounds and intact distal pulses.   Pulmonary/Chest: Effort normal and breath sounds normal. No respiratory distress. He has no decreased breath sounds. He has no wheezes. He has no rhonchi. He has no rales.   Abdominal: Soft. Normal appearance and bowel sounds are normal. There is no tenderness. There is no rigidity, no rebound, no guarding, no CVA tenderness, no tenderness at McBurney's point and negative Olmedo's sign. No hernia.   No tenderness with palpation to abdomen.    Musculoskeletal: Normal range of motion.   Neurological: He is alert and oriented to person,  place, and time. He has normal strength. No cranial nerve deficit or sensory deficit. GCS eye subscore is 4. GCS verbal subscore is 5. GCS motor subscore is 6.   Alert and oriented; follows commands.    Skin: Skin is warm and dry. There is pallor.   Psychiatric: He has a normal mood and affect. His behavior is normal. Judgment and thought content normal.         ED Course   Procedures  Labs Reviewed   APTT - Abnormal; Notable for the following components:       Result Value    aPTT 35.0 (*)     All other components within normal limits   CBC W/ AUTO DIFFERENTIAL - Abnormal; Notable for the following components:    RBC 3.59 (*)     Hemoglobin 10.7 (*)     Hematocrit 34.0 (*)     Mean Corpuscular Hemoglobin Conc 31.5 (*)     RDW 15.9 (*)     Immature Granulocytes 0.6 (*)     Immature Grans (Abs) 0.05 (*)     Gran% 73.7 (*)     Lymph% 15.1 (*)     All other components within normal limits   COMPREHENSIVE METABOLIC PANEL - Abnormal; Notable for the following components:    CO2 18 (*)     BUN, Bld 50 (*)     Creatinine 1.5 (*)     Calcium 8.0 (*)     Total Protein 5.8 (*)     Albumin 2.6 (*)     eGFR if  50 (*)     eGFR if non  43 (*)     All other components within normal limits   PROTIME-INR - Abnormal; Notable for the following components:    Prothrombin Time 13.4 (*)     INR 1.3 (*)     All other components within normal limits   URINALYSIS, REFLEX TO URINE CULTURE - Abnormal; Notable for the following components:    Appearance, UA Hazy (*)     Protein, UA 1+ (*)     Ketones, UA Trace (*)     Bilirubin (UA) 1+ (*)     Occult Blood UA 3+ (*)     All other components within normal limits    Narrative:     Preferred Collection Type->Urine, Clean Catch   LACTIC ACID, PLASMA - Abnormal; Notable for the following components:    Lactate (Lactic Acid) 6.5 (*)     All other components within normal limits    Narrative:      Lactic Acid  critical result(s) called and verbal readback obtained    from Allison Lechuga RN by [facility] 02/25/2020 14:42   URINALYSIS MICROSCOPIC - Abnormal; Notable for the following components:    RBC, UA >100 (*)     Hyaline Casts, UA 5 (*)     Granular Casts, UA 1 (*)     All other components within normal limits    Narrative:     Preferred Collection Type->Urine, Clean Catch   D DIMER, QUANTITATIVE - Abnormal; Notable for the following components:    D-Dimer 0.60 (*)     All other components within normal limits   LACTIC ACID, PLASMA - Abnormal; Notable for the following components:    Lactate (Lactic Acid) 4.3 (*)     All other components within normal limits    Narrative:      Lactic Acid  critical result(s) called and verbal readback obtained   from Dr Gambino by [facility] 02/25/2020 18:51   HEMOGLOBIN - Abnormal; Notable for the following components:    Hemoglobin 10.8 (*)     All other components within normal limits   HEMATOCRIT - Abnormal; Notable for the following components:    Hematocrit 34.2 (*)     All other components within normal limits   INFLUENZA A & B BY MOLECULAR   CULTURE, BLOOD   CULTURE, BLOOD   B-TYPE NATRIURETIC PEPTIDE   CK   CK-MB   TROPONIN I   PHOSPHORUS   MAGNESIUM   PROCALCITONIN   D DIMER, QUANTITATIVE   PROCALCITONIN   DRUG SCREEN PANEL, URINE EMERGENCY   DRUG SCREEN PANEL, URINE EMERGENCY    Narrative:     Preferred Collection Type->Urine, Clean Catch   OCCULT BLOOD X 1, STOOL   CK   CK-MB   TROPONIN I   TYPE & SCREEN          Imaging Results          CT Abdomen Pelvis With Contrast (Final result)  Result time 02/25/20 16:22:46    Final result by Daniel Aleman MD (02/25/20 16:22:46)                 Impression:      No acute abnormality identified in the abdomen or pelvis.    Benign hepatic and renal cysts.    Colonic diverticulosis.    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    Prostate enlargement.    All CT scans at this facility are performed  using dose modulation techniques as appropriate to performed exam including the  following:  automated exposure control; adjustment of mA and/or kV according to the patients size (this includes techniques or standardized protocols for targeted exams where dose is matched to indication/reason for exam: i.e. extremities or head);  iterative reconstruction technique.      Electronically signed by: Daniel Aleman MD  Date:    02/25/2020  Time:    16:22             Narrative:    EXAMINATION:  CT ABDOMEN PELVIS WITH CONTRAST    CLINICAL HISTORY:  Infection, abdomen-pelvis;Nausea, vomiting, diarrhea;    TECHNIQUE:  Axial CT imaging was performed through the abdomen and pelvis with  75cc  of intravenous contrast. Multiplanar reformats were performed and interpreted.    COMPARISON:  None    FINDINGS:  Subsegmental bibasilar atelectasis.  There is a small left ventricular apical aneurysm with associated ventricular thinning, likely post infarction.    Scattered benign hepatic cysts measuring up 1.8  Cm and posterior segment of the right pedicle lobe.  No biliary ductal dilatation.    Bilateral benign renal cysts measuring up to 4.5 cm at the lower pole the left kidney.  No hydronephrosis or urolithiasis.    0.8 cm right adrenal myelolipoma.    The gallbladder is within normal limits.  The pancreas and spleen are within normal limits.    No free fluid, free air, or inflammatory change.    Small bowel is within normal limits.  Colonic diverticulosis.  The appendix is normal.    Aortic atherosclerosis without evidence of aneurysm.    The prostate is enlarged.  There is a Cormier catheter within the urinary bladder which is nondistended.    Scoliotic curvature of the thoracolumbar spine with associated advanced degenerative disc disease and facet DJD.  Severe bilateral hip DJD.                               X-Ray Chest AP Portable (Final result)  Result time 02/25/20 14:44:13    Final result by Jack Tanner MD (02/25/20 14:44:13)                 Impression:      No airspace disease.  Cardiomegaly is more  conspicuous.      Electronically signed by: Jack Ciro  Date:    02/25/2020  Time:    14:44             Narrative:    EXAMINATION:  XR CHEST AP PORTABLE    CLINICAL HISTORY:  Weakness    TECHNIQUE:  Single frontal view of the chest was performed.    COMPARISON:  10/13/2018    FINDINGS:  Lungs are clear aside from low volumes.  Heart size is enlarged.  No pleural effusion or pneumothorax.  Normal pulmonary vascular distribution.  No acute osseous abnormality.                                 Medical Decision Making:   Initial Assessment:   Nursing home resident sent to the ED via EMS for eval of hypotension, possible vasovagal episode after BM.  Presents hypotensive; alert, pale. + vomiting on presentation with brown emesis; not coffee ground. IV fluid bolus intitated per EMS; received 400ml  Afebrile; B/p: 90/50's.   Abdomen is soft and non-distended; non-tender with palpation on exam.   Clinical Tests:   Lab Tests: Ordered and Reviewed  Radiological Study: Ordered and Reviewed  ED Management:  Septic mckenzie initiated although no identifiable source of infection;  Lactic 6.5. WBC:7, H/H 10/34, BUN 50, Cr 1.5; CXR negative. UA neg (+blood most likely due to trauma from catheter; neg. Nitrites, neg leukocytes). Influenza neg. Bld cx X 2 pending.   IV fluids given using ideal body wt. (70.7kg).   B/p 100's/50's; stable after IV fluids.  Stool blood negative.   Pale with brown emesis per reports; 4 hr repeat H/H remains stable 10.8/34.2; repeat lactic 4.3  Troponin neg.                                    Clinical Impression:       ICD-10-CM ICD-9-CM   1. Weakness R53.1 780.79   2. Chest pain R07.9 786.50   3. Hypotension I95.9 458.9   4. Cardiovascular disease I25.10 429.2         Disposition:   Disposition: Placed in Observation  Condition: Stable     ED Disposition Condition    Observation                           Bryan Gambino MD  02/25/20 2052

## 2020-02-26 VITALS
HEIGHT: 69 IN | OXYGEN SATURATION: 95 % | DIASTOLIC BLOOD PRESSURE: 73 MMHG | WEIGHT: 172.63 LBS | SYSTOLIC BLOOD PRESSURE: 110 MMHG | TEMPERATURE: 97 F | RESPIRATION RATE: 18 BRPM | BODY MASS INDEX: 25.57 KG/M2 | HEART RATE: 73 BPM

## 2020-02-26 PROBLEM — R79.89 ELEVATED D-DIMER: Status: ACTIVE | Noted: 2020-02-26

## 2020-02-26 PROBLEM — R79.89 ELEVATED TROPONIN I LEVEL: Status: ACTIVE | Noted: 2020-02-26

## 2020-02-26 PROBLEM — E86.1 HYPOTENSION DUE TO HYPOVOLEMIA: Status: ACTIVE | Noted: 2020-02-26

## 2020-02-26 PROBLEM — R79.89 ELEVATED LACTIC ACID LEVEL: Status: ACTIVE | Noted: 2020-02-26

## 2020-02-26 PROBLEM — F03.90 DEMENTIA WITHOUT BEHAVIORAL DISTURBANCE: Status: ACTIVE | Noted: 2020-02-26

## 2020-02-26 LAB
ALBUMIN SERPL BCP-MCNC: 2.8 G/DL (ref 3.5–5.2)
ALP SERPL-CCNC: 60 U/L (ref 55–135)
ALT SERPL W/O P-5'-P-CCNC: 15 U/L (ref 10–44)
ANION GAP SERPL CALC-SCNC: 10 MMOL/L (ref 8–16)
AST SERPL-CCNC: 11 U/L (ref 10–40)
BASOPHILS # BLD AUTO: 0.05 K/UL (ref 0–0.2)
BASOPHILS NFR BLD: 0.6 % (ref 0–1.9)
BILIRUB SERPL-MCNC: 0.4 MG/DL (ref 0.1–1)
BUN SERPL-MCNC: 35 MG/DL (ref 8–23)
CALCIUM SERPL-MCNC: 8.3 MG/DL (ref 8.7–10.5)
CHLORIDE SERPL-SCNC: 109 MMOL/L (ref 95–110)
CO2 SERPL-SCNC: 20 MMOL/L (ref 23–29)
CREAT SERPL-MCNC: 1 MG/DL (ref 0.5–1.4)
DIFFERENTIAL METHOD: ABNORMAL
EOSINOPHIL # BLD AUTO: 0.1 K/UL (ref 0–0.5)
EOSINOPHIL NFR BLD: 1.6 % (ref 0–8)
ERYTHROCYTE [DISTWIDTH] IN BLOOD BY AUTOMATED COUNT: 15.6 % (ref 11.5–14.5)
EST. GFR  (AFRICAN AMERICAN): >60 ML/MIN/1.73 M^2
EST. GFR  (NON AFRICAN AMERICAN): >60 ML/MIN/1.73 M^2
ESTIMATED AVG GLUCOSE: 94 MG/DL (ref 68–131)
GLUCOSE SERPL-MCNC: 76 MG/DL (ref 70–110)
HBA1C MFR BLD HPLC: 4.9 % (ref 4–5.6)
HCT VFR BLD AUTO: 33.2 % (ref 40–54)
HGB BLD-MCNC: 10.7 G/DL (ref 14–18)
IMM GRANULOCYTES # BLD AUTO: 0.04 K/UL (ref 0–0.04)
IMM GRANULOCYTES NFR BLD AUTO: 0.5 % (ref 0–0.5)
LACTATE SERPL-SCNC: 1.1 MMOL/L (ref 0.5–2.2)
LACTATE SERPL-SCNC: 1.3 MMOL/L (ref 0.5–2.2)
LYMPHOCYTES # BLD AUTO: 1 K/UL (ref 1–4.8)
LYMPHOCYTES NFR BLD: 12.1 % (ref 18–48)
MCH RBC QN AUTO: 30.1 PG (ref 27–31)
MCHC RBC AUTO-ENTMCNC: 32.2 G/DL (ref 32–36)
MCV RBC AUTO: 93 FL (ref 82–98)
MONOCYTES # BLD AUTO: 0.8 K/UL (ref 0.3–1)
MONOCYTES NFR BLD: 9 % (ref 4–15)
NEUTROPHILS # BLD AUTO: 6.5 K/UL (ref 1.8–7.7)
NEUTROPHILS NFR BLD: 76.2 % (ref 38–73)
NRBC BLD-RTO: 0 /100 WBC
PLATELET # BLD AUTO: 268 K/UL (ref 150–350)
PMV BLD AUTO: 10.3 FL (ref 9.2–12.9)
POCT GLUCOSE: 183 MG/DL (ref 70–110)
POCT GLUCOSE: 65 MG/DL (ref 70–110)
POCT GLUCOSE: 75 MG/DL (ref 70–110)
POTASSIUM SERPL-SCNC: 4.9 MMOL/L (ref 3.5–5.1)
PROT SERPL-MCNC: 6.1 G/DL (ref 6–8.4)
RBC # BLD AUTO: 3.56 M/UL (ref 4.6–6.2)
SODIUM SERPL-SCNC: 139 MMOL/L (ref 136–145)
TROPONIN I SERPL DL<=0.01 NG/ML-MCNC: 0.03 NG/ML (ref 0–0.03)
TROPONIN I SERPL DL<=0.01 NG/ML-MCNC: <0.006 NG/ML (ref 0–0.03)
WBC # BLD AUTO: 8.51 K/UL (ref 3.9–12.7)

## 2020-02-26 PROCEDURE — 96361 HYDRATE IV INFUSION ADD-ON: CPT

## 2020-02-26 PROCEDURE — 25000003 PHARM REV CODE 250: Performed by: SURGERY

## 2020-02-26 PROCEDURE — 25000003 PHARM REV CODE 250: Performed by: NURSE PRACTITIONER

## 2020-02-26 PROCEDURE — 99220 PR INITIAL OBSERVATION CARE,LEVL III: CPT | Mod: ,,, | Performed by: FAMILY MEDICINE

## 2020-02-26 PROCEDURE — 80053 COMPREHEN METABOLIC PANEL: CPT

## 2020-02-26 PROCEDURE — G0378 HOSPITAL OBSERVATION PER HR: HCPCS

## 2020-02-26 PROCEDURE — 36415 COLL VENOUS BLD VENIPUNCTURE: CPT

## 2020-02-26 PROCEDURE — 84484 ASSAY OF TROPONIN QUANT: CPT

## 2020-02-26 PROCEDURE — 85025 COMPLETE CBC W/AUTO DIFF WBC: CPT

## 2020-02-26 PROCEDURE — 94760 N-INVAS EAR/PLS OXIMETRY 1: CPT

## 2020-02-26 PROCEDURE — 93005 ELECTROCARDIOGRAM TRACING: CPT

## 2020-02-26 PROCEDURE — 83605 ASSAY OF LACTIC ACID: CPT

## 2020-02-26 PROCEDURE — 63600175 PHARM REV CODE 636 W HCPCS: Performed by: SURGERY

## 2020-02-26 PROCEDURE — 99220 PR INITIAL OBSERVATION CARE,LEVL III: ICD-10-PCS | Mod: ,,, | Performed by: FAMILY MEDICINE

## 2020-02-26 RX ORDER — CARVEDILOL 3.12 MG/1
3.12 TABLET ORAL 2 TIMES DAILY
Status: DISCONTINUED | OUTPATIENT
Start: 2020-02-26 | End: 2020-02-26 | Stop reason: HOSPADM

## 2020-02-26 RX ORDER — TALC
3 POWDER (GRAM) TOPICAL NIGHTLY
Status: DISCONTINUED | OUTPATIENT
Start: 2020-02-26 | End: 2020-02-26 | Stop reason: HOSPADM

## 2020-02-26 RX ORDER — AMIODARONE HYDROCHLORIDE 200 MG/1
200 TABLET ORAL DAILY
Status: DISCONTINUED | OUTPATIENT
Start: 2020-02-26 | End: 2020-02-26 | Stop reason: HOSPADM

## 2020-02-26 RX ORDER — DONEPEZIL HYDROCHLORIDE 5 MG/1
10 TABLET, FILM COATED ORAL NIGHTLY
Status: DISCONTINUED | OUTPATIENT
Start: 2020-02-26 | End: 2020-02-26 | Stop reason: HOSPADM

## 2020-02-26 RX ORDER — ASPIRIN 81 MG/1
81 TABLET ORAL DAILY
Status: DISCONTINUED | OUTPATIENT
Start: 2020-02-26 | End: 2020-02-26 | Stop reason: HOSPADM

## 2020-02-26 RX ORDER — PRAVASTATIN SODIUM 40 MG/1
40 TABLET ORAL NIGHTLY
Status: DISCONTINUED | OUTPATIENT
Start: 2020-02-26 | End: 2020-02-26 | Stop reason: HOSPADM

## 2020-02-26 RX ORDER — LEVOTHYROXINE SODIUM 50 UG/1
50 TABLET ORAL
Status: DISCONTINUED | OUTPATIENT
Start: 2020-02-26 | End: 2020-02-26 | Stop reason: HOSPADM

## 2020-02-26 RX ORDER — ASPIRIN 81 MG/1
81 TABLET ORAL DAILY
Qty: 30 TABLET | Refills: 0 | Status: ON HOLD | OUTPATIENT
Start: 2020-02-26 | End: 2021-02-22 | Stop reason: HOSPADM

## 2020-02-26 RX ORDER — CARVEDILOL 3.12 MG/1
3.12 TABLET ORAL 2 TIMES DAILY
Qty: 60 TABLET | Refills: 0 | Status: ON HOLD | OUTPATIENT
Start: 2020-02-26 | End: 2021-02-22 | Stop reason: HOSPADM

## 2020-02-26 RX ADMIN — SODIUM CHLORIDE: 0.9 INJECTION, SOLUTION INTRAVENOUS at 06:02

## 2020-02-26 RX ADMIN — CARVEDILOL 3.12 MG: 3.12 TABLET, FILM COATED ORAL at 11:02

## 2020-02-26 RX ADMIN — PANTOPRAZOLE SODIUM 40 MG: 40 TABLET, DELAYED RELEASE ORAL at 08:02

## 2020-02-26 RX ADMIN — ASPIRIN 81 MG: 81 TABLET, COATED ORAL at 11:02

## 2020-02-26 RX ADMIN — AMIODARONE HYDROCHLORIDE 200 MG: 200 TABLET ORAL at 11:02

## 2020-02-26 NOTE — ASSESSMENT & PLAN NOTE
Home meds include coreg and losartan , + diuretics  All home meds on hold due to hypotension, IVVD, resume coreg as tolerated for AFIb

## 2020-02-26 NOTE — ASSESSMENT & PLAN NOTE
Sepsis protocol initiated- given zosn and Vanc in ER x 1 d  Lactate now normal , procalcitonin negative   WBC:7, H/H 10/34, BUN 50, Cr 1.5; CXR negative. UA neg (+blood most likely due to trauma from catheter; neg. Nitrites, neg leukocytes). Influenza neg. Bld cx X 2 pending

## 2020-02-26 NOTE — ASSESSMENT & PLAN NOTE
H&H stable   Continue xarelto , pacerone if ok per cards- Dr. Woods consulted   Resume coreg as tolerated

## 2020-02-26 NOTE — PROGRESS NOTES
Staff Handoff  Transferred from ER via stretcher. No equipment needed. With DESTINEE Moise and GLADYS Varma. Assumed care. Patient stable. AAOx3, except situation. POC discussed. Will continue to monitor.       Resident Handoff

## 2020-02-26 NOTE — H&P
[facility] Medicine  History & Physical    Patient Name: Mao Morse  MRN: [id number]  Admission Date: 2/25/2020  Attending Physician: Lisandro Biggs MD   Primary Care Provider: Elsa Unger NP         Patient information was obtained from patient and ER records.     Subjective:     Principal Problem:<principal problem not specified>    Chief Complaint:   Chief Complaint   Patient presents with    Hypotension    Emesis        HPI: Mao Morse is a 79 y.o. Male with PMH of alzheimer disease, Atrial fibrillation (on xarelto), CAD, COPD, DMII, GERD, Hyperlipidemia, HTN, hypokalemia, hypomagnesemia, MI (x2), Thyroid disease who presents to the ED per EMS personnel with reports of hypotension with vomiting.   Per EMS reports, patient was on toilet for BM when he became weak and hypotensive. Upon arrival, B/P was noted to be 60's/40's. En route to hospital patient began vomiting brown emesis; denies coffee ground. He received approximately 400cc IV fluids PTA and presents to the ED alert, pale, B/P 90's/50's, junctional rhythm noted.  Lactate 4.3>3.3>1.1 , procalcitonin 0.12 negative   TNI negative x 2, 3rd TN I 0.033 , D- Dimer 0.60   WBC:7, H/H 10/34, BUN 50, Cr 1.5; CXR negative. UA neg (+blood most likely due to trauma from catheter; neg. Nitrites, neg leukocytes). Influenza neg. Bld cx X 2 pending. Given 1 dose of Vanc and Zosyn     Past Medical History:   Diagnosis Date    Alzheimer disease     Anticoagulant long-term use     Atrial fibrillation     CAD (coronary artery disease)     COPD (chronic obstructive pulmonary disease)     Dementia     Diabetes mellitus type II     GERD (gastroesophageal reflux disease)     Hyperlipidemia     Hypertension     Hypokalemia     Hypomagnesemia     Insomnia     MI (myocardial infarction)     x 2    Mitral valve disorder     Thyroid disease        Past Surgical History:   Procedure Laterality Date    CORONARY STENT  PLACEMENT      KNEE SURGERY      left    SPINE SURGERY      c-spine       Review of patient's allergies indicates:  No Known Allergies    No current facility-administered medications on file prior to encounter.      Current Outpatient Medications on File Prior to Encounter   Medication Sig    amiodarone (PACERONE) 200 MG Tab Take 200 mg by mouth once daily.    amlodipine (NORVASC) 5 MG tablet Take 1 tablet (5 mg total) by mouth once daily. (Patient taking differently: Take 10 mg by mouth once daily. )    carvedilol (COREG) 12.5 MG tablet Take 12.5 mg by mouth 2 (two) times daily. Notify MD if heart rate below 54    donepezil (ARICEPT) 10 MG tablet TAKE ONE TABLET BY MOUTH EVERY EVENING    glipiZIDE (GLUCOTROL) 5 MG TR24 TAKE ONE TABLET BY MOUTH ONCE A DAY WITH BREAKFAST    hydrochlorothiazide (HYDRODIURIL) 25 MG tablet Take 1 tablet (25 mg total) by mouth once daily.    levothyroxine (SYNTHROID) 50 MCG tablet Take 50 mcg by mouth once daily.    losartan (COZAAR) 100 MG tablet Take 100 mg by mouth once daily.    magnesium oxide (MAG-OX) 400 mg tablet Take 400 mg by mouth 2 (two) times daily.     melatonin 3 mg Tab Take 1 capsule by mouth every evening. 3 mg daily    metformin (GLUCOPHAGE) 1000 MG tablet TAKE ONE TABLET BY MOUTH TWICE DAILY WITH MEALS    NITROSTAT 0.4 mg SL tablet Place 1 tablet under the tongue as needed.    omeprazole (PRILOSEC) 40 MG capsule Take 1 capsule (40 mg total) by mouth every morning.    potassium chloride SA (K-DUR,KLOR-CON) 20 MEQ tablet Take 1 tablet (20 mEq total) by mouth once daily.    pravastatin (PRAVACHOL) 40 MG tablet TAKE ONE TABLET BY MOUTH ONCE A DAY    rivaroxaban (XARELTO) 20 mg Tab Take 20 mg by mouth daily with dinner or evening meal.    spironolactone (ALDACTONE) 25 MG tablet Take 25 mg by mouth once daily.     Family History     Problem Relation (Age of Onset)    Hypertension Father        Tobacco Use    Smoking status: Never Smoker    Smokeless  tobacco: Never Used   Substance and Sexual Activity    Alcohol use: No    Drug use: No    Sexual activity: Not on file     Review of Systems   Unable to perform ROS: Dementia     Objective:     Vital Signs (Most Recent):  Temp: 97.1 °F (36.2 °C) (02/26/20 0710)  Pulse: 75 (02/26/20 0920)  Resp: 20 (02/26/20 0710)  BP: 119/71 (02/26/20 0920)  SpO2: 95 % (02/26/20 0710) Vital Signs (24h Range):  Temp:  [96 °F (35.6 °C)-97.1 °F (36.2 °C)] 97.1 °F (36.2 °C)  Pulse:  [51-91] 75  Resp:  [16-20] 20  SpO2:  [91 %-100 %] 95 %  BP: ()/(50-88) 119/71     Weight: 78.3 kg (172 lb 9.9 oz)  Body mass index is 25.49 kg/m².    Physical Exam   Constitutional: He appears well-developed and well-nourished.   HENT:   Head: Normocephalic and atraumatic.   Right Ear: External ear normal.   Left Ear: External ear normal.   Nose: Nose normal.   Mouth/Throat: Oropharynx is clear and moist.   Eyes: Pupils are equal, round, and reactive to light. Conjunctivae and EOM are normal. Right eye exhibits no discharge. Left eye exhibits no discharge. No scleral icterus.   Neck: Normal range of motion. Neck supple. No JVD present. No tracheal deviation present. No thyromegaly present.   Cardiovascular: Normal rate, regular rhythm, normal heart sounds and intact distal pulses.   No murmur heard.  Pulmonary/Chest: Effort normal and breath sounds normal. No respiratory distress. He has no wheezes. He has no rales. He exhibits no tenderness.   Abdominal: Soft. Bowel sounds are normal. He exhibits no distension and no mass. There is no tenderness. There is no rebound and no guarding.   Musculoskeletal: Normal range of motion.   Lymphadenopathy:     He has no cervical adenopathy.   Neurological: He is alert. He has normal reflexes. He displays normal reflexes. No cranial nerve deficit. He exhibits normal muscle tone. Coordination normal.   Skin: Skin is warm and dry.   Psychiatric: He has a normal mood and affect. His behavior is normal. Judgment  and thought content normal.         CRANIAL NERVES     CN III, IV, VI   Pupils are equal, round, and reactive to light.  Extraocular motions are normal.        Significant Labs:   A1C:   Recent Labs   Lab 02/25/20  1408   HGBA1C 4.9     ABGs: No results for input(s): PH, PCO2, HCO3, POCSATURATED, BE, TOTALHB, COHB, METHB, O2HB, POCFIO2 in the last 48 hours.  Blood Culture:   Recent Labs   Lab 02/25/20  1408   LABBLOO No Growth to date  No Growth to date     CBC:   Recent Labs   Lab 02/25/20  1408 02/25/20 1827 02/26/20  0503   WBC 7.75  --  8.51   HGB 10.7* 10.8* 10.7*   HCT 34.0* 34.2* 33.2*     --  268     CMP:   Recent Labs   Lab 02/25/20  1408 02/26/20  0503    139   K 5.1 4.9    109   CO2 18* 20*   GLU 99 76   BUN 50* 35*   CREATININE 1.5* 1.0   CALCIUM 8.0* 8.3*   PROT 5.8* 6.1   ALBUMIN 2.6* 2.8*   BILITOT 0.3 0.4   ALKPHOS 59 60   AST 11 11   ALT 13 15   ANIONGAP 14 10   EGFRNONAA 43* >60     Cardiac Markers:   Recent Labs   Lab 02/25/20  1408   BNP 40     Lactic Acid:   Recent Labs   Lab 02/25/20 1827 02/25/20  2302 02/26/20  0503   LACTATE 4.3* 3.3* 1.1     Lipid Panel: No results for input(s): CHOL, HDL, LDLCALC, TRIG, CHOLHDL in the last 48 hours.  Magnesium:   Recent Labs   Lab 02/25/20  1408   MG 1.9     Troponin:   Recent Labs   Lab 02/25/20 1827 02/25/20  2302 02/26/20  0503   TROPONINI <0.006 <0.006 0.033*     TSH: No results for input(s): TSH in the last 4320 hours.  Urine Culture: No results for input(s): LABURIN in the last 48 hours.  Urine Studies:   Recent Labs   Lab 02/25/20  1343   COLORU Yellow   APPEARANCEUA Hazy*   PHUR 6.0   SPECGRAV 1.025   PROTEINUA 1+*   GLUCUA Negative   KETONESU Trace*   BILIRUBINUA 1+*   OCCULTUA 3+*   NITRITE Negative   UROBILINOGEN 1.0   LEUKOCYTESUR Negative   RBCUA >100*   WBCUA 2   BACTERIA Occasional   HYALINECASTS 5*   Influenza negative     All pertinent labs within the past 24 hours have been reviewed.    Significant Imaging: I have  reviewed all pertinent imaging results/findings within the past 24 hours.     2/25/2020 CXR-   Lungs are clear aside from low volumes.  Heart size is enlarged.  No pleural effusion or pneumothorax.  Normal pulmonary vascular distribution.  No acute osseous abnormality.  No airspace disease.  Cardiomegaly is more conspicuous    2/25/2020 CT abd pelvis   No acute abnormality identified in the abdomen or pelvis.  Benign hepatic and renal cysts.  Colonic diverticulosis.  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  Prostate enlargement.  .    Assessment/Plan:     Dementia without behavioral disturbance  Resume aricept     Elevated lactic acid level  Sepsis protocol initiated- given zosn and Vanc in ER x 1 d  Lactate now normal , procalcitonin negative   WBC:7, H/H 10/34, BUN 50, Cr 1.5; CXR negative. UA neg (+blood most likely due to trauma from catheter; neg. Nitrites, neg leukocytes). Influenza neg. Bld cx X 2 pending      Elevated d-dimer  o2 sat 95% on RA, I dont think PE needs to be ruled out on this pt  He is on eliquis regardless. If he has a PE, its not clinically significant and will not change his mngt.   Consulted Cards     Elevated troponin I level  Consult cardiology       Hypotension due to hypovolemia  Continue IV hydration,   BP meds and diuretics on hold   Repeat orthostatics are normal and creatine is back to baseline.  Will d/c back to NH today       Weakness  Due to IVVD  IV hydration      Atrial fibrillation  H&H stable   Continue xarelto , pacerone if ok per cards- Dr. Woods consulted   Resume coreg as tolerated       Type 2 diabetes mellitus without complication, without long-term current use of insulin    Takes metformin at home; hold here     Subclinical hypothyroidism    Continue synthroid     ASCVD (arteriosclerotic cardiovascular disease)  Continue statin, takes NOAC for AFIB  Asa 81 mg po daily per cards       Hyperlipidemia with target LDL less than 70    LFTs -  normal  Resume statin     Benign essential HTN  Home meds include coreg and losartan , + diuretics  All home meds on hold due to hypotension, IVVD, resume coreg as tolerated for AFIb         VTE Risk Mitigation (From admission, onward)         Ordered     rivaroxaban tablet 20 mg  With dinner      02/26/20 0947     IP VTE HIGH RISK PATIENT  Once      02/25/20 2023     Place sequential compression device  Until discontinued      02/25/20 2023                   iLsandro Biggs MD  Department of Hospital Medicine   [facility]

## 2020-02-26 NOTE — HOSPITAL COURSE
2/26 HPI Continued; pt feeling much better this am; wants to go home.  BUN 50>35, Creat .5>1.0 , /59 - 131/75 , Repeat WBC nml,   o2 sat 95% on RA;   Check orthostatics, consult PT

## 2020-02-26 NOTE — HPI
Mao Morse is a 79 y.o. Male with PMH of alzheimer disease, Atrial fibrillation (on xarelto), CAD, COPD, DMII, GERD, Hyperlipidemia, HTN, hypokalemia, hypomagnesemia, MI (x2), Thyroid disease who presents to the ED per EMS personnel with reports of hypotension with vomiting.   Per EMS reports, patient was on toilet for BM when he became weak and hypotensive. Upon arrival, B/P was noted to be 60's/40's. En route to hospital patient began vomiting brown emesis; denies coffee ground. He received approximately 400cc IV fluids PTA and presents to the ED alert, pale, B/P 90's/50's, junctional rhythm noted.  Lactate 4.3>3.3>1.1 , procalcitonin 0.12 negative   TNI negative x 2, 3rd TN I 0.033 , D- Dimer 0.60   WBC:7, H/H 10/34, BUN 50, Cr 1.5; CXR negative. UA neg (+blood most likely due to trauma from catheter; neg. Nitrites, neg leukocytes). Influenza neg. Bld cx X 2 pending. Given 1 dose of Vanc and Zosyn

## 2020-02-26 NOTE — CONSULTS
Patient is a resident of [facility] where all of his post-acute care needs will be met. SW will update [facility] on the patient's status while in the hospital via [employer] and assist with the patient's return to the nursing home.     Gavi Fink LMSW

## 2020-02-26 NOTE — PLAN OF CARE
Problem: Fall Injury Risk  Goal: Absence of Fall and Fall-Related Injury  Outcome: Ongoing, Progressing     Problem: Adult Inpatient Plan of Care  Goal: Plan of Care Review  Outcome: Ongoing, Progressing  Goal: Patient-Specific Goal (Individualization)  Outcome: Ongoing, Progressing  Goal: Absence of Hospital-Acquired Illness or Injury  Outcome: Ongoing, Progressing  Goal: Optimal Comfort and Wellbeing  Outcome: Ongoing, Progressing  Goal: Readiness for Transition of Care  Outcome: Ongoing, Progressing  Goal: Rounds/Family Conference  Outcome: Ongoing, Progressing     Problem: Diabetes Comorbidity  Goal: Blood Glucose Level Within Desired Range  Outcome: Ongoing, Progressing     Problem: Skin Injury Risk Increased  Goal: Skin Health and Integrity  Outcome: Ongoing, Progressing     Problem: Balance Impairment (Functional Deficit)  Goal: Improved Balance and Postural Control  Outcome: Ongoing, Progressing     Problem: Cognitive Impairment  Goal: Optimal Functional [place]  Outcome: Ongoing, Progressing     Problem: Muscle Strength Impairment  Goal: Improved Muscle Strength  Outcome: Ongoing, Progressing     Problem: Range of Motion Impairment (Functional Deficit)  Goal: Optimal Range of Motion  Outcome: Ongoing, Progressing     Problem: Sensory Impairment (Functional Deficit)  Goal: Compensation for Sensory Deficit  Outcome: Ongoing, Progressing     Problem: Electrolyte Imbalance  Goal: Electrolyte Balance  Outcome: Ongoing, Progressing

## 2020-02-26 NOTE — PLAN OF CARE
02/26/20 1123   Post-Acute Status   Post-Acute Authorization Placement   Post-Acute Placement Status Set-up Complete   Discharge Delays None known at this time       Patient returning to [facility] for detention care. Updated clinicals faxed to [facility]. Awaiting their review of the documentation and report is called, [facility] will transport the patient back to their facility via their van.     Gavi Fink LMSW

## 2020-02-26 NOTE — PLAN OF CARE
Patient admitted for monitoring after hypotensive event. Patient is a resident of [facility], and will return upon discharge. Orthostatic blood pressures ordered for today along with imaging. If everything is normal, patient would be able to discharge back to [facility] today. Dx explained to patient along with treatment plan. CM will continue to follow and assist as needed with discharge plan.

## 2020-02-26 NOTE — PLAN OF CARE
02/26/20 1144   Final Note   Assessment Type Final Discharge Note   Anticipated Discharge Disposition Home   What phone number can be called within the next 1-3 days to see how you are doing after discharge? [phone]   Hospital Follow Up  Appt(s) scheduled? Yes   Discharge plans and expectations educations in teach back method with documentation complete? Yes       Patient returning to [facility].     Gavi Fink LMSW

## 2020-02-26 NOTE — ASSESSMENT & PLAN NOTE
H&H stable   Continue xarelto , pacerone if ok per cards- Dr. Woods consulted   Resume lower dose coreg 3.125mg per cards

## 2020-02-26 NOTE — PLAN OF CARE
02/26/20 0952   Advance Directives (For Healthcare)   Advance Directive  (If Adv Dir status is received, view document under Adv Dir in header or Chart Review Media tab) Advance Directive received from patient today. (Meets all criteria- Signature, Date, 2 Witnesses)       Advance Care Planning     Living Will  During this visit, I engaged the patient  in the advance care planning process.  The patient and I reviewed the role for advance directives and their purpose in directing future healthcare if the patient's unable to speak for him/herself.  At this point in time, the patient does have full decision-making capacity. The patient communicated that if he were comatose and had little chance of a meaningful recovery, he would not want machines/life-sustaining treatments used. The patient has completed a living will to reflect these preferences.      Code Status  In light of the patients advanced and life limiting illness,I engaged the the patient in a conversation about the patient's preferences for care  at the very end of life. The patient wishes to have a natural, peaceful death.  Along those lines, the patient does not wish to have CPR or other invasive treatments performed when his heart and/or breathing stops. I communicated to the patient that a DNR order would be placed in his medical record to reflect this preference and DNR form was completed and will be scanned into EPIC.     Gavi Fink LMSW

## 2020-02-26 NOTE — PLAN OF CARE
02/26/20 0952   POLLOCK Message   Medicare Outpatient and Observation Notification regarding financial responsibility Given to patient/caregiver;Explained to patient/caregiver;Signed/date by patient/caregiver   Date POLLOCK was signed 02/26/20   Time POLLOCK was signed 0853

## 2020-02-26 NOTE — SUBJECTIVE & OBJECTIVE
Past Medical History:   Diagnosis Date    Alzheimer disease     Anticoagulant long-term use     Atrial fibrillation     CAD (coronary artery disease)     COPD (chronic obstructive pulmonary disease)     Dementia     Diabetes mellitus type II     GERD (gastroesophageal reflux disease)     Hyperlipidemia     Hypertension     Hypokalemia     Hypomagnesemia     Insomnia     MI (myocardial infarction)     x 2    Mitral valve disorder     Thyroid disease        Past Surgical History:   Procedure Laterality Date    CORONARY STENT PLACEMENT      KNEE SURGERY      left    SPINE SURGERY      c-spine       Review of patient's allergies indicates:  No Known Allergies    No current facility-administered medications on file prior to encounter.      Current Outpatient Medications on File Prior to Encounter   Medication Sig    amiodarone (PACERONE) 200 MG Tab Take 200 mg by mouth once daily.    amlodipine (NORVASC) 5 MG tablet Take 1 tablet (5 mg total) by mouth once daily. (Patient taking differently: Take 10 mg by mouth once daily. )    carvedilol (COREG) 12.5 MG tablet Take 12.5 mg by mouth 2 (two) times daily. Notify MD if heart rate below 54    donepezil (ARICEPT) 10 MG tablet TAKE ONE TABLET BY MOUTH EVERY EVENING    glipiZIDE (GLUCOTROL) 5 MG TR24 TAKE ONE TABLET BY MOUTH ONCE A DAY WITH BREAKFAST    hydrochlorothiazide (HYDRODIURIL) 25 MG tablet Take 1 tablet (25 mg total) by mouth once daily.    levothyroxine (SYNTHROID) 50 MCG tablet Take 50 mcg by mouth once daily.    losartan (COZAAR) 100 MG tablet Take 100 mg by mouth once daily.    magnesium oxide (MAG-OX) 400 mg tablet Take 400 mg by mouth 2 (two) times daily.     melatonin 3 mg Tab Take 1 capsule by mouth every evening. 3 mg daily    metformin (GLUCOPHAGE) 1000 MG tablet TAKE ONE TABLET BY MOUTH TWICE DAILY WITH MEALS    NITROSTAT 0.4 mg SL tablet Place 1 tablet under the tongue as needed.    omeprazole (PRILOSEC) 40 MG capsule Take 1  capsule (40 mg total) by mouth every morning.    potassium chloride SA (K-DUR,KLOR-CON) 20 MEQ tablet Take 1 tablet (20 mEq total) by mouth once daily.    pravastatin (PRAVACHOL) 40 MG tablet TAKE ONE TABLET BY MOUTH ONCE A DAY    rivaroxaban (XARELTO) 20 mg Tab Take 20 mg by mouth daily with dinner or evening meal.    spironolactone (ALDACTONE) 25 MG tablet Take 25 mg by mouth once daily.     Family History     Problem Relation (Age of Onset)    Hypertension Father        Tobacco Use    Smoking status: Never Smoker    Smokeless tobacco: Never Used   Substance and Sexual Activity    Alcohol use: No    Drug use: No    Sexual activity: Not on file     Review of Systems   Unable to perform ROS: Dementia     Objective:     Vital Signs (Most Recent):  Temp: 97.1 °F (36.2 °C) (02/26/20 0710)  Pulse: 75 (02/26/20 0920)  Resp: 20 (02/26/20 0710)  BP: 119/71 (02/26/20 0920)  SpO2: 95 % (02/26/20 0710) Vital Signs (24h Range):  Temp:  [96 °F (35.6 °C)-97.1 °F (36.2 °C)] 97.1 °F (36.2 °C)  Pulse:  [51-91] 75  Resp:  [16-20] 20  SpO2:  [91 %-100 %] 95 %  BP: ()/(50-88) 119/71     Weight: 78.3 kg (172 lb 9.9 oz)  Body mass index is 25.49 kg/m².    Physical Exam   Constitutional: He appears well-developed and well-nourished.   HENT:   Head: Normocephalic and atraumatic.   Right Ear: External ear normal.   Left Ear: External ear normal.   Nose: Nose normal.   Mouth/Throat: Oropharynx is clear and moist.   Eyes: Pupils are equal, round, and reactive to light. Conjunctivae and EOM are normal. Right eye exhibits no discharge. Left eye exhibits no discharge. No scleral icterus.   Neck: Normal range of motion. Neck supple. No JVD present. No tracheal deviation present. No thyromegaly present.   Cardiovascular: Normal rate, regular rhythm, normal heart sounds and intact distal pulses.   No murmur heard.  Pulmonary/Chest: Effort normal and breath sounds normal. No respiratory distress. He has no wheezes. He has no rales.  He exhibits no tenderness.   Abdominal: Soft. Bowel sounds are normal. He exhibits no distension and no mass. There is no tenderness. There is no rebound and no guarding.   Musculoskeletal: Normal range of motion.   Lymphadenopathy:     He has no cervical adenopathy.   Neurological: He is alert. He has normal reflexes. He displays normal reflexes. No cranial nerve deficit. He exhibits normal muscle tone. Coordination normal.   Skin: Skin is warm and dry.   Psychiatric: He has a normal mood and affect. His behavior is normal. Judgment and thought content normal.         CRANIAL NERVES     CN III, IV, VI   Pupils are equal, round, and reactive to light.  Extraocular motions are normal.        Significant Labs:   A1C:   Recent Labs   Lab 02/25/20  1408   HGBA1C 4.9     ABGs: No results for input(s): PH, PCO2, HCO3, POCSATURATED, BE, TOTALHB, COHB, METHB, O2HB, POCFIO2 in the last 48 hours.  Blood Culture:   Recent Labs   Lab 02/25/20  1408   LABBLOO No Growth to date  No Growth to date     CBC:   Recent Labs   Lab 02/25/20  1408 02/25/20 1827 02/26/20  0503   WBC 7.75  --  8.51   HGB 10.7* 10.8* 10.7*   HCT 34.0* 34.2* 33.2*     --  268     CMP:   Recent Labs   Lab 02/25/20  1408 02/26/20  0503    139   K 5.1 4.9    109   CO2 18* 20*   GLU 99 76   BUN 50* 35*   CREATININE 1.5* 1.0   CALCIUM 8.0* 8.3*   PROT 5.8* 6.1   ALBUMIN 2.6* 2.8*   BILITOT 0.3 0.4   ALKPHOS 59 60   AST 11 11   ALT 13 15   ANIONGAP 14 10   EGFRNONAA 43* >60     Cardiac Markers:   Recent Labs   Lab 02/25/20  1408   BNP 40     Lactic Acid:   Recent Labs   Lab 02/25/20 1827 02/25/20  2302 02/26/20  0503   LACTATE 4.3* 3.3* 1.1     Lipid Panel: No results for input(s): CHOL, HDL, LDLCALC, TRIG, CHOLHDL in the last 48 hours.  Magnesium:   Recent Labs   Lab 02/25/20  1408   MG 1.9     Troponin:   Recent Labs   Lab 02/25/20  1827 02/25/20  2302 02/26/20  0503   TROPONINI <0.006 <0.006 0.033*     TSH: No results for input(s): TSH in  the last 4320 hours.  Urine Culture: No results for input(s): LABURIN in the last 48 hours.  Urine Studies:   Recent Labs   Lab 02/25/20  1343   COLORU Yellow   APPEARANCEUA Hazy*   PHUR 6.0   SPECGRAV 1.025   PROTEINUA 1+*   GLUCUA Negative   KETONESU Trace*   BILIRUBINUA 1+*   OCCULTUA 3+*   NITRITE Negative   UROBILINOGEN 1.0   LEUKOCYTESUR Negative   RBCUA >100*   WBCUA 2   BACTERIA Occasional   HYALINECASTS 5*   Influenza negative     All pertinent labs within the past 24 hours have been reviewed.    Significant Imaging: I have reviewed all pertinent imaging results/findings within the past 24 hours.     2/25/2020 CXR-   Lungs are clear aside from low volumes.  Heart size is enlarged.  No pleural effusion or pneumothorax.  Normal pulmonary vascular distribution.  No acute osseous abnormality.  No airspace disease.  Cardiomegaly is more conspicuous    2/25/2020 CT abd pelvis   No acute abnormality identified in the abdomen or pelvis.  Benign hepatic and renal cysts.  Colonic diverticulosis.  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  Prostate enlargement.  .

## 2020-02-26 NOTE — ASSESSMENT & PLAN NOTE
Sepsis protocol initiated- given zosn and Vanc in ER x 1 d  Lactate now normal , procalcitonin negative   WBC:7, H/H 10/34, BUN 50, Cr 1.5; CXR negative. UA neg (+blood most likely due to trauma from catheter; neg. Nitrites, neg leukocytes). Influenza neg. Bld cx X 2 pending  Much better this am

## 2020-02-26 NOTE — PROGRESS NOTES
Staff Handoff  Patient morning POCT was 65, PCT was asked to recheck blood sugar. It was then 75. Will continue to monitor.       Resident Handoff

## 2020-02-26 NOTE — CONSULTS
[facility]  Cardiology  Consult Note    Patient Name: Mao Morse  MRN: [id number]  Admission Date: 2/25/2020  Hospital Length of Stay: 0 days  Code Status: Full Code   Consulting Provider: Leobardo Rliey NP  Primary Care Physician: Elsa Unger NP  Principal Problem:<principal problem not specified>      Inpatient consult to Cardiology  Consult performed by: Leobardo Riley NP  Consult ordered by: Ирина Domingo NP        Subjective:     Chief Complaint:  Hypotension at the nursing home    HPI: 80 y/o male with PMHx of CVA, parkinsonism, CAD, PAF, HTN, dyslipidemia, presents to the ED with c/o hypotension from  The nursing home, Pt also with N/V yesterday. Labs with elevation in creatinine. Elizabeth negative x 4 sets. Pt denies any symptoms of CP, SOB, palps. CIS asked to evaluate.    Past Medical History:   Diagnosis Date    Alzheimer disease     Anticoagulant long-term use     Atrial fibrillation     CAD (coronary artery disease)     COPD (chronic obstructive pulmonary disease)     Dementia     Diabetes mellitus type II     GERD (gastroesophageal reflux disease)     Hyperlipidemia     Hypertension     Hypokalemia     Hypomagnesemia     Insomnia     MI (myocardial infarction)     x 2    Mitral valve disorder     Thyroid disease        Past Surgical History:   Procedure Laterality Date    CORONARY STENT PLACEMENT      KNEE SURGERY      left    SPINE SURGERY      c-spine       Review of patient's allergies indicates:  No Known Allergies    No current facility-administered medications on file prior to encounter.      Current Outpatient Medications on File Prior to Encounter   Medication Sig    amiodarone (PACERONE) 200 MG Tab Take 200 mg by mouth once daily.    amlodipine (NORVASC) 5 MG tablet Take 1 tablet (5 mg total) by mouth once daily. (Patient taking differently: Take 10 mg by mouth once daily. )    carvedilol (COREG) 12.5 MG tablet Take 12.5 mg by mouth 2  (two) times daily. Notify MD if heart rate below 54    donepezil (ARICEPT) 10 MG tablet TAKE ONE TABLET BY MOUTH EVERY EVENING    glipiZIDE (GLUCOTROL) 5 MG TR24 TAKE ONE TABLET BY MOUTH ONCE A DAY WITH BREAKFAST    hydrochlorothiazide (HYDRODIURIL) 25 MG tablet Take 1 tablet (25 mg total) by mouth once daily.    levothyroxine (SYNTHROID) 50 MCG tablet Take 50 mcg by mouth once daily.    losartan (COZAAR) 100 MG tablet Take 100 mg by mouth once daily.    magnesium oxide (MAG-OX) 400 mg tablet Take 400 mg by mouth 2 (two) times daily.     melatonin 3 mg Tab Take 1 capsule by mouth every evening. 3 mg daily    metformin (GLUCOPHAGE) 1000 MG tablet TAKE ONE TABLET BY MOUTH TWICE DAILY WITH MEALS    NITROSTAT 0.4 mg SL tablet Place 1 tablet under the tongue as needed.    omeprazole (PRILOSEC) 40 MG capsule Take 1 capsule (40 mg total) by mouth every morning.    potassium chloride SA (K-DUR,KLOR-CON) 20 MEQ tablet Take 1 tablet (20 mEq total) by mouth once daily.    pravastatin (PRAVACHOL) 40 MG tablet TAKE ONE TABLET BY MOUTH ONCE A DAY    rivaroxaban (XARELTO) 20 mg Tab Take 20 mg by mouth daily with dinner or evening meal.    spironolactone (ALDACTONE) 25 MG tablet Take 25 mg by mouth once daily.     Family History     Problem Relation (Age of Onset)    Hypertension Father        Tobacco Use    Smoking status: Never Smoker    Smokeless tobacco: Never Used   Substance and Sexual Activity    Alcohol use: No    Drug use: No    Sexual activity: Not on file     ROS     Constitutional : Negative  EENT : Negative  CV : Negative  Respiratory : Negative  Gastrointestinal: Negative   Genitourinary: Negative  Musculoskeletal: Negative  Skin : Negative  Neurological : AAO x 3     Objective:     Vital Signs (Most Recent):  Temp: 97.1 °F (36.2 °C) (02/26/20 0710)  Pulse: 75 (02/26/20 0920)  Resp: 20 (02/26/20 0710)  BP: 119/71 (02/26/20 0920)  SpO2: 95 % (02/26/20 0710) Vital Signs (24h Range):  Temp:  [96 °F  (35.6 °C)-97.1 °F (36.2 °C)] 97.1 °F (36.2 °C)  Pulse:  [51-91] 75  Resp:  [16-20] 20  SpO2:  [91 %-100 %] 95 %  BP: ()/(50-88) 119/71     Weight: 78.3 kg (172 lb 9.9 oz)  Body mass index is 25.49 kg/m².    SpO2: 95 %  O2 Device (Oxygen Therapy): room air      Intake/Output Summary (Last 24 hours) at 2/26/2020 0927  Last data filed at 2/26/2020 0800  Gross per 24 hour   Intake 1824 ml   Output 325 ml   Net 1499 ml       Lines/Drains/Airways     Peripheral Intravenous Line                 Peripheral IV - Single Lumen 02/25/20 1332 18 G Left Antecubital less than 1 day         Peripheral IV - Single Lumen 02/25/20 1359 20 G Right Antecubital less than 1 day                Physical Exam  General appearance: alert, appears stated age and cooperative  Head: Normocephalic, without obvious abnormality, atraumatic  Eyes: conjunctivae/corneas clear. PERRL  Neck: no carotid bruit, no JVD and supple, symmetrical, trachea midline  Lungs: clear to auscultation bilaterally, normal respiratory effort  Chest Wall: no tenderness  Heart: regular rate and rhythm, S1, S2 normal, no murmur, click, rub or gallop  Abdomen: soft, non-tender; bowel sounds normal; no masses,  no organomegaly  Extremities: Extremities normal, atraumatic, no cyanosis, clubbing, or edema  Pulses: Dorsalis Pedis R: 2+ (normal)/L: 2+ (normal)  Skin: Skin color, texture, turgor normal. No rashes or lesions  Neurologic: Normal mood and affect  Alert and oriented X 3    Significant Labs:   BMP:   Recent Labs   Lab 02/25/20  1408 02/26/20  0503   GLU 99 76    139   K 5.1 4.9    109   CO2 18* 20*   BUN 50* 35*   CREATININE 1.5* 1.0   CALCIUM 8.0* 8.3*   MG 1.9  --    , CMP   Recent Labs   Lab 02/25/20  1408 02/26/20  0503    139   K 5.1 4.9    109   CO2 18* 20*   GLU 99 76   BUN 50* 35*   CREATININE 1.5* 1.0   CALCIUM 8.0* 8.3*   PROT 5.8* 6.1   ALBUMIN 2.6* 2.8*   BILITOT 0.3 0.4   ALKPHOS 59 60   AST 11 11   ALT 13 15   ANIONGAP 14 10    ESTGFRAFRICA 50* >60   EGFRNONAA 43* >60   , CBC   Recent Labs   Lab 02/25/20  1408 02/25/20  1827 02/26/20  0503   WBC 7.75  --  8.51   HGB 10.7* 10.8* 10.7*   HCT 34.0* 34.2* 33.2*     --  268    and Troponin   Recent Labs   Lab 02/25/20  1827 02/25/20  2302 02/26/20  0503   TROPONINI <0.006 <0.006 0.033*         Assessment and Plan:     Active Diagnoses:    Diagnosis Date Noted POA    Hypotension due to hypovolemia [I95.89, E86.1] 02/26/2020 Yes    Elevated troponin I level [R79.89] 02/26/2020 No    Weakness [R53.1] 02/25/2020 Yes    Atrial fibrillation [I48.91] 12/20/2016 Yes    Type 2 diabetes mellitus without complication, without long-term current use of insulin [E11.9] 11/22/2016 Yes    Benign essential HTN [I10] 08/04/2015 Yes    Hyperlipidemia with target LDL less than 70 [E78.5] 08/04/2015 Yes    ASCVD (arteriosclerotic cardiovascular disease) [I25.10] 08/04/2015 Yes      Problems Resolved During this Admission:       VTE Risk Mitigation (From admission, onward)         Ordered     IP VTE HIGH RISK PATIENT  Once      02/25/20 2023     Place sequential compression device  Until discontinued      02/25/20 2023                Current Facility-Administered Medications   Medication    0.9%  NaCl infusion    acetaminophen tablet 650 mg    ondansetron injection 4 mg    pantoprazole EC tablet 40 mg    promethazine (PHENERGAN) 12.5 mg in dextrose 5 % 50 mL IVPB    sodium chloride 0.9% flush 10 mL         DX:  Hypotension (dehydration)  CAD, last perfusion 2017 no stress induced ischemia  CMO, EF 35% June 2018  Dyslipidemia controlled  PAF  Hx CVA        Leobardo Riley, NAWAF  Cardiology   [facility]    PLAN:  Pt admitted with hypotension from Nursing home, resolved.  EKG and tele maintains NSR with no ST changes. Some artifact of EKG given parkinsonism.  Elizabeth negative to date.  Pt with no c/o CP throughout above.  Resume home meds. Hold cozaar, hctz, amlodipine, aldactone  until BP better. Decrease dose of coreg.  Agree with DC today.  Close outpt f/u

## 2020-02-26 NOTE — ASSESSMENT & PLAN NOTE
Home meds include coreg and losartan , + diuretics  All home meds on hold due to hypotension, IVVD,   resume lower dose coreg as tolerated for AFIb

## 2020-02-26 NOTE — PLAN OF CARE
02/26/20 0951   Discharge Assessment   Assessment Type Discharge Planning Assessment   Confirmed/corrected address and phone number on facesheet? Yes   Assessment information obtained from? Patient   Prior to hospitilization cognitive status: Alert/Oriented   Prior to hospitalization functional status: Partially Dependent   Current cognitive status: Alert/Oriented   Current Functional Status: Partially Dependent   Facility Arrived From: [facility]   Lives With facility resident   Able to Return to Prior Arrangements yes   Is patient able to care for self after discharge? No   Who are your caregiver(s) and their phone number(s)? Marjan Guerrero (Daughter) [phone]   Patient's perception of discharge disposition shelter care facility   Readmission Within the Last 30 Days no previous admission in last 30 days   Patient currently being followed by outpatient case management? No   Patient currently receives any other outside agency services? No   Equipment Currently Used at Home wheelchair;hospital bed   Do you have any problems affording any of your prescribed medications? No   Does the patient have transportation home? Yes   Transportation Anticipated agency   Discharge Plan A Return to nursing home   DME Needed Upon Discharge  none   Patient/Family in Agreement with Plan yes     No post-acute care needs identified at this time. SW to continue to monitor needs throughout hospital stay.     Gavi Fink LMSW

## 2020-02-26 NOTE — ASSESSMENT & PLAN NOTE
Continue IV hydration,   BP meds and diuretics on hold   Repeat orthostatics are normal and creatine is back to baseline.  Will d/c back to NH today

## 2020-02-26 NOTE — NURSING
Awake and alert. Disoriented to time, place, and situation. Telemetry monitor removed. Peripheral IVs removed. Catheter tips intact. AVS given and explained. [place] [facility] [place].

## 2020-02-26 NOTE — DISCHARGE SUMMARY
[facility] Hospital Medicine  Discharge Summary      Patient Name: Mao Morse  MRN: [id number]  Admission Date: 2/25/2020  Hospital Length of Stay: 0 days  Discharge Date and Time:  02/26/2020 11:03 AM  Attending Physician: Landry Biggs MD   Discharging Provider: Ирина Domingo NP  Primary Care Provider: Elsa Unger NP      HPI:   Mao Morse is a 79 y.o. Male with PMH of alzheimer disease, Atrial fibrillation (on xarelto), CAD, COPD, DMII, GERD, Hyperlipidemia, HTN, hypokalemia, hypomagnesemia, MI (x2), Thyroid disease who presents to the ED per EMS personnel with reports of hypotension with vomiting.   Per EMS reports, patient was on toilet for BM when he became weak and hypotensive. Upon arrival, B/P was noted to be 60's/40's. En route to hospital patient began vomiting brown emesis; denies coffee ground. He received approximately 400cc IV fluids PTA and presents to the ED alert, pale, B/P 90's/50's, junctional rhythm noted.  Lactate 4.3>3.3>1.1 , procalcitonin 0.12 negative   TNI negative x 2, 3rd TN I 0.033 , D- Dimer 0.60   WBC:7, H/H 10/34, BUN 50, Cr 1.5; CXR negative. UA neg (+blood most likely due to trauma from catheter; neg. Nitrites, neg leukocytes). Influenza neg. Bld cx X 2 pending. Given 1 dose of Vanc and Zosyn     * No surgery found *      Hospital Course:   2/26 HPI Continued; pt feeling much better this am; wants to go home.  BUN 50>35, Creat .5>1.0 , /59 - 131/75 , Repeat WBC nml,   o2 sat 95% on RA;   Check orthostatics, consult PT      Consults:   Consults (From admission, onward)        Status Ordering Provider     Inpatient consult to Cardiology  Once     Provider:  Fausto Woods MD    Completed ИРИНА DOMINGO     IP consult to case management  Once     Provider:  (Not yet assigned)    LANDRY Hill          * Hypotension due to hypovolemia  Continue IV hydration,   BP meds and diuretics on hold   Repeat orthostatics are  normal and creatine is back to baseline.  Will d/c back to NH today       Dementia without behavioral disturbance  Resume aricept     Elevated lactic acid level  Sepsis protocol initiated- given zosn and Vanc in ER x 1 d  Lactate now normal , procalcitonin negative   WBC:7, H/H 10/34, BUN 50, Cr 1.5; CXR negative. UA neg (+blood most likely due to trauma from catheter; neg. Nitrites, neg leukocytes). Influenza neg. Bld cx X 2 pending  Much better this am     Elevated d-dimer  o2 sat 95% on RA, pt already on NOAC   Consulted Cards     Elevated troponin I level  Consult cardiology   OP f/u with cards if ok to D/C later today       Weakness  Due to IVVD  IV hydration  Feeling much better this am     Atrial fibrillation  H&H stable   Continue xarelto , pacerone if ok per cards- Dr. Woods consulted   Resume lower dose coreg 3.125mg per cards     Type 2 diabetes mellitus without complication, without long-term current use of insulin    Takes metformin at home; hold here   Resume at d/c     Subclinical hypothyroidism    Continue synthroid     ASCVD (arteriosclerotic cardiovascular disease)  Continue statin, takes NOAC for AFIB  Asa 81 mg po daily per cards       Hyperlipidemia with target LDL less than 70    LFTs - normal  Resume statin     Benign essential HTN  Home meds include coreg and losartan , + diuretics  All home meds on hold due to hypotension, IVVD,   resume lower dose coreg as tolerated for AFIb         Final Active Diagnoses:    Diagnosis Date Noted POA    PRINCIPAL PROBLEM:  Hypotension due to hypovolemia [I95.89, E86.1] 02/26/2020 Yes    Elevated troponin I level [R79.89] 02/26/2020 No    Elevated d-dimer [R79.89] 02/26/2020 Yes    Elevated lactic acid level [R79.89] 02/26/2020 Yes    Dementia without behavioral disturbance [F03.90] 02/26/2020 Yes    Weakness [R53.1] 02/25/2020 Yes    Atrial fibrillation [I48.91] 12/20/2016 Yes    Type 2 diabetes mellitus without complication, without long-term  current use of insulin [E11.9] 11/22/2016 Yes    Subclinical hypothyroidism [E03.9] 02/12/2016 Yes    Benign essential HTN [I10] 08/04/2015 Yes    Hyperlipidemia with target LDL less than 70 [E78.5] 08/04/2015 Yes    ASCVD (arteriosclerotic cardiovascular disease) [I25.10] 08/04/2015 Yes      Problems Resolved During this Admission:       Discharged Condition: stable    Disposition: Home or Self Care    Follow Up:  Follow-up Information     Schedule an appointment as soon as possible for a visit with Elsa Unger NP.    Specialty:  Family Medicine  Contact information:  [address] [place] [address]  [phone]             Schedule an appointment as soon as possible for a visit with Fausto Woods MD.    Specialty:  Cardiology  Why:  1 week   Contact information:  [address] [place] [address] [phone]                 Patient Instructions:   No discharge procedures on file.    Significant Diagnostic Studies:     A1C:       Recent Labs   Lab 02/25/20  1408   HGBA1C 4.9      ABGs: No results for input(s): PH, PCO2, HCO3, POCSATURATED, BE, TOTALHB, COHB, METHB, O2HB, POCFIO2 in the last 48 hours.  Blood Culture:       Recent Labs   Lab 02/25/20  1408   LABBLOO No Growth to date  No Growth to date      CBC:         Recent Labs   Lab 02/25/20  1408 02/25/20 1827 02/26/20  0503   WBC 7.75  --  8.51   HGB 10.7* 10.8* 10.7*   HCT 34.0* 34.2* 33.2*     --  268      CMP:        Recent Labs   Lab 02/25/20  1408 02/26/20  0503    139   K 5.1 4.9    109   CO2 18* 20*   GLU 99 76   BUN 50* 35*   CREATININE 1.5* 1.0   CALCIUM 8.0* 8.3*   PROT 5.8* 6.1   ALBUMIN 2.6* 2.8*   BILITOT 0.3 0.4   ALKPHOS 59 60   AST 11 11   ALT 13 15   ANIONGAP 14 10   EGFRNONAA 43* >60      Cardiac Markers:       Recent Labs   Lab 02/25/20  1408   BNP 40      Lactic Acid:         Recent Labs   Lab 02/25/20 1827 02/25/20  2302 02/26/20  0503   LACTATE 4.3* 3.3* 1.1      Lipid Panel: No results for  input(s): CHOL, HDL, LDLCALC, TRIG, CHOLHDL in the last 48 hours.  Magnesium:       Recent Labs   Lab 02/25/20  1408   MG 1.9      Troponin:         Recent Labs   Lab 02/25/20  1827 02/25/20  2302 02/26/20  0503   TROPONINI <0.006 <0.006 0.033*      TSH: No results for input(s): TSH in the last 4320 hours.  Urine Culture: No results for input(s): LABURIN in the last 48 hours.  Urine Studies:       Recent Labs   Lab 02/25/20  1343   COLORU Yellow   APPEARANCEUA Hazy*   PHUR 6.0   SPECGRAV 1.025   PROTEINUA 1+*   GLUCUA Negative   KETONESU Trace*   BILIRUBINUA 1+*   OCCULTUA 3+*   NITRITE Negative   UROBILINOGEN 1.0   LEUKOCYTESUR Negative   RBCUA >100*   WBCUA 2   BACTERIA Occasional   HYALINECASTS 5*   Influenza negative      All pertinent labs within the past 24 hours have been reviewed.     Significant Imaging: I have reviewed all pertinent imaging results/findings within the past 24 hours.      2/25/2020 CXR-   Lungs are clear aside from low volumes.  Heart size is enlarged.  No pleural effusion or pneumothorax.  Normal pulmonary vascular distribution.  No acute osseous abnormality.  No airspace disease.  Cardiomegaly is more conspicuous     2/25/2020 CT abd pelvis   No acute abnormality identified in the abdomen or pelvis.  Benign hepatic and renal cysts.  Colonic diverticulosis.  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  Prostate enlargement.  .    Pending Diagnostic Studies:     Procedure Component Value Units Date/Time    Lactic acid, plasma [[id number]] Collected:  02/26/20 1059    Order Status:  Sent Lab Status:  In process Updated:  02/26/20 1100    Specimen:  Blood     Troponin I [[id number]] Collected:  02/26/20 1059    Order Status:  Sent Lab Status:  In process Updated:  02/26/20 1100    Specimen:  Blood          Medications:  Reconciled Home Medications:      Medication List      START taking these medications    aspirin 81 MG EC tablet  Commonly known as:  ECOTRIN  Take 1 tablet  (81 mg total) by mouth once daily.        CHANGE how you take these medications    carvediloL 3.125 MG tablet  Commonly known as:  COREG  Take 1 tablet (3.125 mg total) by mouth 2 (two) times daily.  What changed:    · medication strength  · how much to take  · additional instructions        CONTINUE taking these medications    amiodarone 200 MG Tab  Commonly known as:  PACERONE  Take 200 mg by mouth once daily.     donepezil 10 MG tablet  Commonly known as:  ARICEPT  TAKE ONE TABLET BY MOUTH EVERY EVENING     glipiZIDE 5 MG Tr24  Commonly known as:  GLUCOTROL  TAKE ONE TABLET BY MOUTH ONCE A DAY WITH BREAKFAST     levothyroxine 50 MCG tablet  Commonly known as:  SYNTHROID  Take 50 mcg by mouth once daily.     magnesium oxide 400 mg (241.3 mg magnesium) tablet  Commonly known as:  MAG-OX  Take 400 mg by mouth 2 (two) times daily.     melatonin 3 mg tablet  Commonly known as:  MELATIN  Take 1 capsule by mouth every evening. 3 mg daily     metFORMIN 1000 MG tablet  Commonly known as:  GLUCOPHAGE  TAKE ONE TABLET BY MOUTH TWICE DAILY WITH MEALS     Nitrostat 0.4 MG SL tablet  Generic drug:  nitroGLYCERIN  Place 1 tablet under the tongue as needed.     omeprazole 40 MG capsule  Commonly known as:  PRILOSEC  Take 1 capsule (40 mg total) by mouth every morning.     pravastatin 40 MG tablet  Commonly known as:  PRAVACHOL  TAKE ONE TABLET BY MOUTH ONCE A DAY     Xarelto 20 mg Tab  Generic drug:  rivaroxaban  Take 20 mg by mouth daily with dinner or evening meal.        STOP taking these medications    amLODIPine 5 MG tablet  Commonly known as:  NORVASC     hydroCHLOROthiazide 25 MG tablet  Commonly known as:  HYDRODIURIL     losartan 100 MG tablet  Commonly known as:  COZAAR     potassium chloride SA 20 MEQ tablet  Commonly known as:  K-DUR,KLOR-CON     spironolactone 25 MG tablet  Commonly known as:  ALDACTONE            Indwelling Lines/Drains at time of discharge:   Lines/Drains/Airways     None                 Time  spent on the discharge of patient: 30 minutes  Patient was seen and examined on the date of discharge and determined to be suitable for discharge.         Ирина Domingo NP  Department of Hospital Medicine  [facility]

## 2020-03-02 LAB
BACTERIA BLD CULT: NORMAL
BACTERIA BLD CULT: NORMAL

## 2020-05-05 ENCOUNTER — LAB VISIT (OUTPATIENT)
Dept: LAB | Facility: HOSPITAL | Age: 80
End: 2020-05-05
Attending: HOSPITALIST
Payer: COMMERCIAL

## 2020-05-05 DIAGNOSIS — I10 ESSENTIAL HYPERTENSION, MALIGNANT: Primary | ICD-10-CM

## 2020-05-05 DIAGNOSIS — E87.6 HYPOPOTASSEMIA: ICD-10-CM

## 2020-05-05 LAB
ALBUMIN SERPL BCP-MCNC: 2.5 G/DL (ref 3.5–5.2)
ALP SERPL-CCNC: 88 U/L (ref 55–135)
ALT SERPL W/O P-5'-P-CCNC: 14 U/L (ref 10–44)
ANION GAP SERPL CALC-SCNC: 13 MMOL/L (ref 8–16)
AST SERPL-CCNC: 15 U/L (ref 10–40)
BASOPHILS # BLD AUTO: 0.05 K/UL (ref 0–0.2)
BASOPHILS NFR BLD: 0.5 % (ref 0–1.9)
BILIRUB SERPL-MCNC: 0.2 MG/DL (ref 0.1–1)
BUN SERPL-MCNC: 9 MG/DL (ref 8–23)
CALCIUM SERPL-MCNC: 7.8 MG/DL (ref 8.7–10.5)
CHLORIDE SERPL-SCNC: 104 MMOL/L (ref 95–110)
CO2 SERPL-SCNC: 27 MMOL/L (ref 23–29)
CREAT SERPL-MCNC: 0.7 MG/DL (ref 0.5–1.4)
DIFFERENTIAL METHOD: ABNORMAL
EOSINOPHIL # BLD AUTO: 0.1 K/UL (ref 0–0.5)
EOSINOPHIL NFR BLD: 1.1 % (ref 0–8)
ERYTHROCYTE [DISTWIDTH] IN BLOOD BY AUTOMATED COUNT: 14.7 % (ref 11.5–14.5)
EST. GFR  (AFRICAN AMERICAN): >60 ML/MIN/1.73 M^2
EST. GFR  (NON AFRICAN AMERICAN): >60 ML/MIN/1.73 M^2
GLUCOSE SERPL-MCNC: 105 MG/DL (ref 70–110)
HCT VFR BLD AUTO: 38 % (ref 40–54)
HGB BLD-MCNC: 11.8 G/DL (ref 14–18)
IMM GRANULOCYTES # BLD AUTO: 0.04 K/UL (ref 0–0.04)
IMM GRANULOCYTES NFR BLD AUTO: 0.4 % (ref 0–0.5)
LYMPHOCYTES # BLD AUTO: 1.4 K/UL (ref 1–4.8)
LYMPHOCYTES NFR BLD: 14.2 % (ref 18–48)
MCH RBC QN AUTO: 28.6 PG (ref 27–31)
MCHC RBC AUTO-ENTMCNC: 31.1 G/DL (ref 32–36)
MCV RBC AUTO: 92 FL (ref 82–98)
MONOCYTES # BLD AUTO: 0.7 K/UL (ref 0.3–1)
MONOCYTES NFR BLD: 6.8 % (ref 4–15)
NEUTROPHILS # BLD AUTO: 7.6 K/UL (ref 1.8–7.7)
NEUTROPHILS NFR BLD: 77 % (ref 38–73)
NRBC BLD-RTO: 0 /100 WBC
PLATELET # BLD AUTO: 346 K/UL (ref 150–350)
PMV BLD AUTO: 10.1 FL (ref 9.2–12.9)
POTASSIUM SERPL-SCNC: 4.1 MMOL/L (ref 3.5–5.1)
PROT SERPL-MCNC: 5.4 G/DL (ref 6–8.4)
RBC # BLD AUTO: 4.12 M/UL (ref 4.6–6.2)
SODIUM SERPL-SCNC: 144 MMOL/L (ref 136–145)
WBC # BLD AUTO: 9.85 K/UL (ref 3.9–12.7)

## 2020-05-05 PROCEDURE — 85025 COMPLETE CBC W/AUTO DIFF WBC: CPT

## 2020-05-05 PROCEDURE — 80053 COMPREHEN METABOLIC PANEL: CPT

## 2021-02-20 ENCOUNTER — HOSPITAL ENCOUNTER (EMERGENCY)
Facility: HOSPITAL | Age: 81
Discharge: ANOTHER HOSPITAL | End: 2021-02-20
Attending: SURGERY
Payer: COMMERCIAL

## 2021-02-20 VITALS
SYSTOLIC BLOOD PRESSURE: 128 MMHG | HEIGHT: 69 IN | WEIGHT: 149 LBS | TEMPERATURE: 97 F | RESPIRATION RATE: 16 BRPM | DIASTOLIC BLOOD PRESSURE: 60 MMHG | HEART RATE: 52 BPM | BODY MASS INDEX: 22.07 KG/M2 | OXYGEN SATURATION: 98 %

## 2021-02-20 DIAGNOSIS — R57.1 HYPOVOLEMIC SHOCK: ICD-10-CM

## 2021-02-20 DIAGNOSIS — K92.2 LOWER GI BLEED: Primary | ICD-10-CM

## 2021-02-20 PROBLEM — D62 ACUTE BLOOD LOSS ANEMIA: Status: ACTIVE | Noted: 2021-02-20

## 2021-02-20 LAB
ABO + RH BLD: NORMAL
ALBUMIN SERPL BCP-MCNC: 2.5 G/DL (ref 3.5–5.2)
ALP SERPL-CCNC: 71 U/L (ref 55–135)
ALT SERPL W/O P-5'-P-CCNC: 17 U/L (ref 10–44)
AMORPH CRY URNS QL MICRO: ABNORMAL
ANION GAP SERPL CALC-SCNC: 12 MMOL/L (ref 8–16)
APTT BLDCRRT: 26.5 SEC (ref 21–32)
AST SERPL-CCNC: 20 U/L (ref 10–40)
BACTERIA #/AREA URNS HPF: ABNORMAL /HPF
BASOPHILS # BLD AUTO: 0.11 K/UL (ref 0–0.2)
BASOPHILS NFR BLD: 1 % (ref 0–1.9)
BILIRUB SERPL-MCNC: 0.3 MG/DL (ref 0.1–1)
BILIRUB UR QL STRIP: NEGATIVE
BLD GP AB SCN CELLS X3 SERPL QL: NORMAL
BLD PROD TYP BPU: NORMAL
BLD PROD TYP BPU: NORMAL
BLOOD UNIT EXPIRATION DATE: NORMAL
BLOOD UNIT EXPIRATION DATE: NORMAL
BLOOD UNIT TYPE CODE: 7300
BLOOD UNIT TYPE CODE: 7300
BLOOD UNIT TYPE: NORMAL
BLOOD UNIT TYPE: NORMAL
BNP SERPL-MCNC: 120 PG/ML (ref 0–99)
BUN SERPL-MCNC: 16 MG/DL (ref 8–23)
CALCIUM SERPL-MCNC: 7.7 MG/DL (ref 8.7–10.5)
CHLORIDE SERPL-SCNC: 105 MMOL/L (ref 95–110)
CK MB SERPL-MCNC: 0.9 NG/ML (ref 0.1–6.5)
CK MB SERPL-RTO: 4.7 % (ref 0–5)
CK SERPL-CCNC: 19 U/L (ref 20–200)
CK SERPL-CCNC: 19 U/L (ref 20–200)
CLARITY UR: CLEAR
CO2 SERPL-SCNC: 23 MMOL/L (ref 23–29)
CODING SYSTEM: NORMAL
CODING SYSTEM: NORMAL
COLOR UR: YELLOW
CREAT SERPL-MCNC: 0.7 MG/DL (ref 0.5–1.4)
CRP SERPL-MCNC: 16.8 MG/L (ref 0–8.2)
D DIMER PPP IA.FEU-MCNC: 0.53 MG/L FEU
DIFFERENTIAL METHOD: ABNORMAL
DISPENSE STATUS: NORMAL
DISPENSE STATUS: NORMAL
EOSINOPHIL # BLD AUTO: 0.5 K/UL (ref 0–0.5)
EOSINOPHIL NFR BLD: 4.5 % (ref 0–8)
ERYTHROCYTE [DISTWIDTH] IN BLOOD BY AUTOMATED COUNT: 15.7 % (ref 11.5–14.5)
ERYTHROCYTE [SEDIMENTATION RATE] IN BLOOD BY WESTERGREN METHOD: 18 MM/HR (ref 0–10)
EST. GFR  (AFRICAN AMERICAN): >60 ML/MIN/1.73 M^2
EST. GFR  (NON AFRICAN AMERICAN): >60 ML/MIN/1.73 M^2
FERRITIN SERPL-MCNC: 29 NG/ML (ref 20–300)
GLUCOSE SERPL-MCNC: 119 MG/DL (ref 70–110)
GLUCOSE UR QL STRIP: NEGATIVE
HCT VFR BLD AUTO: 28.7 % (ref 40–54)
HGB BLD-MCNC: 9 G/DL (ref 14–18)
HGB UR QL STRIP: ABNORMAL
HYALINE CASTS #/AREA URNS LPF: 0 /LPF
IMM GRANULOCYTES # BLD AUTO: 0.05 K/UL (ref 0–0.04)
IMM GRANULOCYTES NFR BLD AUTO: 0.4 % (ref 0–0.5)
INR PPP: 1.9 (ref 0.8–1.2)
KETONES UR QL STRIP: ABNORMAL
LACTATE SERPL-SCNC: 4.2 MMOL/L (ref 0.5–2.2)
LDH SERPL L TO P-CCNC: 98 U/L (ref 110–260)
LEUKOCYTE ESTERASE UR QL STRIP: NEGATIVE
LYMPHOCYTES # BLD AUTO: 3.2 K/UL (ref 1–4.8)
LYMPHOCYTES NFR BLD: 28.9 % (ref 18–48)
MCH RBC QN AUTO: 28 PG (ref 27–31)
MCHC RBC AUTO-ENTMCNC: 31.4 G/DL (ref 32–36)
MCV RBC AUTO: 89 FL (ref 82–98)
MICROSCOPIC COMMENT: ABNORMAL
MONOCYTES # BLD AUTO: 0.6 K/UL (ref 0.3–1)
MONOCYTES NFR BLD: 5.4 % (ref 4–15)
NEUTROPHILS # BLD AUTO: 6.7 K/UL (ref 1.8–7.7)
NEUTROPHILS NFR BLD: 59.8 % (ref 38–73)
NITRITE UR QL STRIP: NEGATIVE
NRBC BLD-RTO: 0 /100 WBC
PH UR STRIP: 5 [PH] (ref 5–8)
PLATELET # BLD AUTO: 376 K/UL (ref 150–350)
PMV BLD AUTO: 10.2 FL (ref 9.2–12.9)
POTASSIUM SERPL-SCNC: 3.4 MMOL/L (ref 3.5–5.1)
PROCALCITONIN SERPL IA-MCNC: 0.03 NG/ML
PROT SERPL-MCNC: 5.3 G/DL (ref 6–8.4)
PROT UR QL STRIP: ABNORMAL
PROTHROMBIN TIME: 19.3 SEC (ref 9–12.5)
RBC # BLD AUTO: 3.22 M/UL (ref 4.6–6.2)
RBC #/AREA URNS HPF: 15 /HPF (ref 0–4)
SARS-COV-2 RDRP RESP QL NAA+PROBE: NEGATIVE
SODIUM SERPL-SCNC: 140 MMOL/L (ref 136–145)
SP GR UR STRIP: 1.02 (ref 1–1.03)
TRANS ERYTHROCYTES VOL PATIENT: NORMAL ML
TRANS ERYTHROCYTES VOL PATIENT: NORMAL ML
TROPONIN I SERPL DL<=0.01 NG/ML-MCNC: 0.01 NG/ML (ref 0–0.03)
URN SPEC COLLECT METH UR: ABNORMAL
UROBILINOGEN UR STRIP-ACNC: NEGATIVE EU/DL
WBC # BLD AUTO: 11.23 K/UL (ref 3.9–12.7)
WBC #/AREA URNS HPF: 3 /HPF (ref 0–5)

## 2021-02-20 PROCEDURE — 85730 THROMBOPLASTIN TIME PARTIAL: CPT

## 2021-02-20 PROCEDURE — 83615 LACTATE (LD) (LDH) ENZYME: CPT

## 2021-02-20 PROCEDURE — 63600175 PHARM REV CODE 636 W HCPCS: Performed by: SURGERY

## 2021-02-20 PROCEDURE — U0002 COVID-19 LAB TEST NON-CDC: HCPCS

## 2021-02-20 PROCEDURE — 80053 COMPREHEN METABOLIC PANEL: CPT | Mod: 91

## 2021-02-20 PROCEDURE — 85610 PROTHROMBIN TIME: CPT | Mod: 91

## 2021-02-20 PROCEDURE — 86920 COMPATIBILITY TEST SPIN: CPT

## 2021-02-20 PROCEDURE — 81000 URINALYSIS NONAUTO W/SCOPE: CPT

## 2021-02-20 PROCEDURE — C9113 INJ PANTOPRAZOLE SODIUM, VIA: HCPCS | Performed by: SURGERY

## 2021-02-20 PROCEDURE — 82728 ASSAY OF FERRITIN: CPT

## 2021-02-20 PROCEDURE — 25000003 PHARM REV CODE 250: Performed by: SURGERY

## 2021-02-20 PROCEDURE — 82550 ASSAY OF CK (CPK): CPT

## 2021-02-20 PROCEDURE — 93005 ELECTROCARDIOGRAM TRACING: CPT | Mod: 59

## 2021-02-20 PROCEDURE — 96365 THER/PROPH/DIAG IV INF INIT: CPT | Mod: 59

## 2021-02-20 PROCEDURE — 36556 INSERT NON-TUNNEL CV CATH: CPT

## 2021-02-20 PROCEDURE — 36430 TRANSFUSION BLD/BLD COMPNT: CPT

## 2021-02-20 PROCEDURE — 86900 BLOOD TYPING SEROLOGIC ABO: CPT

## 2021-02-20 PROCEDURE — 84484 ASSAY OF TROPONIN QUANT: CPT

## 2021-02-20 PROCEDURE — 82553 CREATINE MB FRACTION: CPT

## 2021-02-20 PROCEDURE — 96366 THER/PROPH/DIAG IV INF ADDON: CPT | Mod: 59

## 2021-02-20 PROCEDURE — 93010 ELECTROCARDIOGRAM REPORT: CPT | Mod: ,,, | Performed by: INTERNAL MEDICINE

## 2021-02-20 PROCEDURE — 85651 RBC SED RATE NONAUTOMATED: CPT

## 2021-02-20 PROCEDURE — 36415 COLL VENOUS BLD VENIPUNCTURE: CPT

## 2021-02-20 PROCEDURE — P9021 RED BLOOD CELLS UNIT: HCPCS

## 2021-02-20 PROCEDURE — 84145 PROCALCITONIN (PCT): CPT

## 2021-02-20 PROCEDURE — 99291 CRITICAL CARE FIRST HOUR: CPT | Mod: 25

## 2021-02-20 PROCEDURE — 85025 COMPLETE CBC W/AUTO DIFF WBC: CPT | Mod: 91

## 2021-02-20 PROCEDURE — 93010 EKG 12-LEAD: ICD-10-PCS | Mod: ,,, | Performed by: INTERNAL MEDICINE

## 2021-02-20 PROCEDURE — 83605 ASSAY OF LACTIC ACID: CPT

## 2021-02-20 PROCEDURE — 87040 BLOOD CULTURE FOR BACTERIA: CPT

## 2021-02-20 PROCEDURE — 83880 ASSAY OF NATRIURETIC PEPTIDE: CPT

## 2021-02-20 PROCEDURE — 96375 TX/PRO/DX INJ NEW DRUG ADDON: CPT | Mod: 59

## 2021-02-20 PROCEDURE — 85379 FIBRIN DEGRADATION QUANT: CPT

## 2021-02-20 PROCEDURE — 86140 C-REACTIVE PROTEIN: CPT

## 2021-02-20 RX ORDER — PANTOPRAZOLE SODIUM 40 MG/10ML
80 INJECTION, POWDER, LYOPHILIZED, FOR SOLUTION INTRAVENOUS
Status: COMPLETED | OUTPATIENT
Start: 2021-02-20 | End: 2021-02-20

## 2021-02-20 RX ORDER — ONDANSETRON 4 MG/1
4 TABLET, FILM COATED ORAL EVERY 8 HOURS PRN
COMMUNITY

## 2021-02-20 RX ORDER — ACETAMINOPHEN 325 MG/1
325 TABLET ORAL EVERY 4 HOURS PRN
COMMUNITY

## 2021-02-20 RX ORDER — HYDROCODONE BITARTRATE AND ACETAMINOPHEN 500; 5 MG/1; MG/1
TABLET ORAL
Status: DISCONTINUED | OUTPATIENT
Start: 2021-02-20 | End: 2021-02-20 | Stop reason: HOSPADM

## 2021-02-20 RX ORDER — NOREPINEPHRINE BITARTRATE/D5W 4MG/250ML
0-3 PLASTIC BAG, INJECTION (ML) INTRAVENOUS CONTINUOUS
Status: DISCONTINUED | OUTPATIENT
Start: 2021-02-20 | End: 2021-02-20 | Stop reason: HOSPADM

## 2021-02-20 RX ORDER — PANTOPRAZOLE SODIUM 40 MG/10ML
80 INJECTION, POWDER, LYOPHILIZED, FOR SOLUTION INTRAVENOUS
Status: DISCONTINUED | OUTPATIENT
Start: 2021-02-20 | End: 2021-02-20

## 2021-02-20 RX ORDER — SODIUM CHLORIDE 9 MG/ML
INJECTION, SOLUTION INTRAVENOUS
Status: COMPLETED | OUTPATIENT
Start: 2021-02-20 | End: 2021-02-20

## 2021-02-20 RX ORDER — METOPROLOL SUCCINATE 25 MG/1
25 TABLET, EXTENDED RELEASE ORAL DAILY
Status: ON HOLD | COMMUNITY
End: 2021-11-06 | Stop reason: HOSPADM

## 2021-02-20 RX ADMIN — SODIUM CHLORIDE 1000 ML: 0.9 INJECTION, SOLUTION INTRAVENOUS at 07:02

## 2021-02-20 RX ADMIN — SODIUM CHLORIDE: 0.9 INJECTION, SOLUTION INTRAVENOUS at 08:02

## 2021-02-20 RX ADMIN — DEXTROSE 8 MG/HR: 50 INJECTION, SOLUTION INTRAVENOUS at 07:02

## 2021-02-20 RX ADMIN — PANTOPRAZOLE SODIUM 80 MG: 40 INJECTION, POWDER, FOR SOLUTION INTRAVENOUS at 07:02

## 2021-02-25 LAB
BACTERIA BLD CULT: NORMAL
BACTERIA BLD CULT: NORMAL

## 2021-11-04 ENCOUNTER — HOSPITAL ENCOUNTER (EMERGENCY)
Facility: HOSPITAL | Age: 81
Discharge: ANOTHER HOSPITAL | End: 2021-11-05
Attending: STUDENT IN AN ORGANIZED HEALTH CARE EDUCATION/TRAINING PROGRAM
Payer: COMMERCIAL

## 2021-11-04 DIAGNOSIS — R00.1 BRADYCARDIA: ICD-10-CM

## 2021-11-04 DIAGNOSIS — I63.9 STROKE: ICD-10-CM

## 2021-11-04 DIAGNOSIS — R41.82 AMS (ALTERED MENTAL STATUS): ICD-10-CM

## 2021-11-04 PROBLEM — G83.84 TODD'S PARALYSIS: Status: ACTIVE | Noted: 2021-11-04

## 2021-11-04 PROBLEM — R56.9 SEIZURES: Status: ACTIVE | Noted: 2021-11-04

## 2021-11-04 LAB
ALBUMIN SERPL BCP-MCNC: 3.2 G/DL (ref 3.5–5.2)
ALP SERPL-CCNC: 89 U/L (ref 55–135)
ALT SERPL W/O P-5'-P-CCNC: 15 U/L (ref 10–44)
ANION GAP SERPL CALC-SCNC: 19 MMOL/L (ref 8–16)
APTT BLDCRRT: 37.6 SEC (ref 21–32)
AST SERPL-CCNC: 18 U/L (ref 10–40)
BASOPHILS # BLD AUTO: 0.1 K/UL (ref 0–0.2)
BASOPHILS NFR BLD: 0.9 % (ref 0–1.9)
BILIRUB SERPL-MCNC: 0.3 MG/DL (ref 0.1–1)
BILIRUB UR QL STRIP: NEGATIVE
BNP SERPL-MCNC: 129 PG/ML (ref 0–99)
BUN SERPL-MCNC: 19 MG/DL (ref 8–23)
CALCIUM SERPL-MCNC: 9.4 MG/DL (ref 8.7–10.5)
CHLORIDE SERPL-SCNC: 103 MMOL/L (ref 95–110)
CHOLEST SERPL-MCNC: 131 MG/DL (ref 120–199)
CHOLEST/HDLC SERPL: 3 {RATIO} (ref 2–5)
CLARITY UR: CLEAR
CO2 SERPL-SCNC: 18 MMOL/L (ref 23–29)
COLOR UR: YELLOW
CREAT SERPL-MCNC: 1.1 MG/DL (ref 0.5–1.4)
DIFFERENTIAL METHOD: ABNORMAL
EOSINOPHIL # BLD AUTO: 0.1 K/UL (ref 0–0.5)
EOSINOPHIL NFR BLD: 1.3 % (ref 0–8)
ERYTHROCYTE [DISTWIDTH] IN BLOOD BY AUTOMATED COUNT: 18.8 % (ref 11.5–14.5)
EST. GFR  (AFRICAN AMERICAN): >60 ML/MIN/1.73 M^2
EST. GFR  (NON AFRICAN AMERICAN): >60 ML/MIN/1.73 M^2
GLUCOSE SERPL-MCNC: 85 MG/DL (ref 70–110)
GLUCOSE UR QL STRIP: NEGATIVE
HCT VFR BLD AUTO: 36.5 % (ref 40–54)
HDLC SERPL-MCNC: 43 MG/DL (ref 40–75)
HDLC SERPL: 32.8 % (ref 20–50)
HGB BLD-MCNC: 11.7 G/DL (ref 14–18)
HGB UR QL STRIP: NEGATIVE
IMM GRANULOCYTES # BLD AUTO: 0.04 K/UL (ref 0–0.04)
IMM GRANULOCYTES NFR BLD AUTO: 0.4 % (ref 0–0.5)
INR PPP: 1.5 (ref 0.8–1.2)
KETONES UR QL STRIP: ABNORMAL
LDLC SERPL CALC-MCNC: 71.4 MG/DL (ref 63–159)
LEUKOCYTE ESTERASE UR QL STRIP: NEGATIVE
LYMPHOCYTES # BLD AUTO: 1.5 K/UL (ref 1–4.8)
LYMPHOCYTES NFR BLD: 13.6 % (ref 18–48)
MCH RBC QN AUTO: 27.1 PG (ref 27–31)
MCHC RBC AUTO-ENTMCNC: 32.1 G/DL (ref 32–36)
MCV RBC AUTO: 85 FL (ref 82–98)
MONOCYTES # BLD AUTO: 0.8 K/UL (ref 0.3–1)
MONOCYTES NFR BLD: 7.4 % (ref 4–15)
NEUTROPHILS # BLD AUTO: 8.4 K/UL (ref 1.8–7.7)
NEUTROPHILS NFR BLD: 76.4 % (ref 38–73)
NITRITE UR QL STRIP: NEGATIVE
NONHDLC SERPL-MCNC: 88 MG/DL
NRBC BLD-RTO: 0 /100 WBC
PH UR STRIP: 6 [PH] (ref 5–8)
PLATELET # BLD AUTO: 306 K/UL (ref 150–450)
PMV BLD AUTO: 9.8 FL (ref 9.2–12.9)
POTASSIUM SERPL-SCNC: 4.4 MMOL/L (ref 3.5–5.1)
PROT SERPL-MCNC: 7 G/DL (ref 6–8.4)
PROT UR QL STRIP: NEGATIVE
PROTHROMBIN TIME: 15.9 SEC (ref 9–12.5)
RBC # BLD AUTO: 4.32 M/UL (ref 4.6–6.2)
SODIUM SERPL-SCNC: 140 MMOL/L (ref 136–145)
SP GR UR STRIP: 1.02 (ref 1–1.03)
T4 FREE SERPL-MCNC: 1.33 NG/DL (ref 0.71–1.51)
TRIGL SERPL-MCNC: 83 MG/DL (ref 30–150)
TROPONIN I SERPL DL<=0.01 NG/ML-MCNC: 0.01 NG/ML (ref 0–0.03)
TSH SERPL DL<=0.005 MIU/L-ACNC: 5.63 UIU/ML (ref 0.4–4)
URN SPEC COLLECT METH UR: ABNORMAL
UROBILINOGEN UR STRIP-ACNC: NEGATIVE EU/DL
WBC # BLD AUTO: 10.99 K/UL (ref 3.9–12.7)

## 2021-11-04 PROCEDURE — 51702 INSERT TEMP BLADDER CATH: CPT

## 2021-11-04 PROCEDURE — 94760 N-INVAS EAR/PLS OXIMETRY 1: CPT

## 2021-11-04 PROCEDURE — 87040 BLOOD CULTURE FOR BACTERIA: CPT | Performed by: STUDENT IN AN ORGANIZED HEALTH CARE EDUCATION/TRAINING PROGRAM

## 2021-11-04 PROCEDURE — 84443 ASSAY THYROID STIM HORMONE: CPT | Performed by: STUDENT IN AN ORGANIZED HEALTH CARE EDUCATION/TRAINING PROGRAM

## 2021-11-04 PROCEDURE — 96361 HYDRATE IV INFUSION ADD-ON: CPT | Mod: 59

## 2021-11-04 PROCEDURE — 85025 COMPLETE CBC W/AUTO DIFF WBC: CPT | Performed by: STUDENT IN AN ORGANIZED HEALTH CARE EDUCATION/TRAINING PROGRAM

## 2021-11-04 PROCEDURE — 84439 ASSAY OF FREE THYROXINE: CPT | Performed by: STUDENT IN AN ORGANIZED HEALTH CARE EDUCATION/TRAINING PROGRAM

## 2021-11-04 PROCEDURE — 83880 ASSAY OF NATRIURETIC PEPTIDE: CPT | Performed by: STUDENT IN AN ORGANIZED HEALTH CARE EDUCATION/TRAINING PROGRAM

## 2021-11-04 PROCEDURE — 63600175 PHARM REV CODE 636 W HCPCS: Performed by: STUDENT IN AN ORGANIZED HEALTH CARE EDUCATION/TRAINING PROGRAM

## 2021-11-04 PROCEDURE — 80053 COMPREHEN METABOLIC PANEL: CPT | Performed by: STUDENT IN AN ORGANIZED HEALTH CARE EDUCATION/TRAINING PROGRAM

## 2021-11-04 PROCEDURE — G0426 PR INPT TELEHEALTH CONSULT 50M: ICD-10-PCS | Mod: GT,,, | Performed by: PSYCHIATRY & NEUROLOGY

## 2021-11-04 PROCEDURE — 36415 COLL VENOUS BLD VENIPUNCTURE: CPT | Performed by: STUDENT IN AN ORGANIZED HEALTH CARE EDUCATION/TRAINING PROGRAM

## 2021-11-04 PROCEDURE — 99285 EMERGENCY DEPT VISIT HI MDM: CPT | Mod: 25

## 2021-11-04 PROCEDURE — 25000003 PHARM REV CODE 250: Performed by: STUDENT IN AN ORGANIZED HEALTH CARE EDUCATION/TRAINING PROGRAM

## 2021-11-04 PROCEDURE — 81003 URINALYSIS AUTO W/O SCOPE: CPT | Mod: 59 | Performed by: STUDENT IN AN ORGANIZED HEALTH CARE EDUCATION/TRAINING PROGRAM

## 2021-11-04 PROCEDURE — 85730 THROMBOPLASTIN TIME PARTIAL: CPT | Performed by: STUDENT IN AN ORGANIZED HEALTH CARE EDUCATION/TRAINING PROGRAM

## 2021-11-04 PROCEDURE — 85610 PROTHROMBIN TIME: CPT | Performed by: STUDENT IN AN ORGANIZED HEALTH CARE EDUCATION/TRAINING PROGRAM

## 2021-11-04 PROCEDURE — 93005 ELECTROCARDIOGRAM TRACING: CPT

## 2021-11-04 PROCEDURE — 96365 THER/PROPH/DIAG IV INF INIT: CPT | Mod: 59

## 2021-11-04 PROCEDURE — 84484 ASSAY OF TROPONIN QUANT: CPT | Performed by: STUDENT IN AN ORGANIZED HEALTH CARE EDUCATION/TRAINING PROGRAM

## 2021-11-04 PROCEDURE — 80061 LIPID PANEL: CPT | Performed by: STUDENT IN AN ORGANIZED HEALTH CARE EDUCATION/TRAINING PROGRAM

## 2021-11-04 PROCEDURE — G0426 INPT/ED TELECONSULT50: HCPCS | Mod: GT,,, | Performed by: PSYCHIATRY & NEUROLOGY

## 2021-11-04 RX ORDER — LEVETIRACETAM 5 MG/ML
1000 INJECTION INTRAVASCULAR ONCE
Status: COMPLETED | OUTPATIENT
Start: 2021-11-04 | End: 2021-11-04

## 2021-11-04 RX ADMIN — SODIUM CHLORIDE 1000 ML: 0.9 INJECTION, SOLUTION INTRAVENOUS at 09:11

## 2021-11-04 RX ADMIN — LEVETIRACETAM 1000 MG: 5 INJECTION INTRAVENOUS at 10:11

## 2021-11-05 ENCOUNTER — HOSPITAL ENCOUNTER (OUTPATIENT)
Facility: OTHER | Age: 81
Discharge: HOME | End: 2021-11-06
Attending: HOSPITALIST | Admitting: HOSPITALIST
Payer: COMMERCIAL

## 2021-11-05 VITALS
WEIGHT: 147.06 LBS | OXYGEN SATURATION: 95 % | BODY MASS INDEX: 21.72 KG/M2 | SYSTOLIC BLOOD PRESSURE: 107 MMHG | HEART RATE: 54 BPM | RESPIRATION RATE: 18 BRPM | DIASTOLIC BLOOD PRESSURE: 59 MMHG | TEMPERATURE: 97 F

## 2021-11-05 DIAGNOSIS — E11.9 TYPE 2 DIABETES MELLITUS WITHOUT COMPLICATION, WITHOUT LONG-TERM CURRENT USE OF INSULIN: ICD-10-CM

## 2021-11-05 DIAGNOSIS — Z79.01 CURRENT USE OF LONG TERM ANTICOAGULATION: ICD-10-CM

## 2021-11-05 DIAGNOSIS — E03.8 SUBCLINICAL HYPOTHYROIDISM: ICD-10-CM

## 2021-11-05 DIAGNOSIS — G30.9 ALZHEIMER'S DEMENTIA WITHOUT BEHAVIORAL DISTURBANCE, UNSPECIFIED TIMING OF DEMENTIA ONSET: ICD-10-CM

## 2021-11-05 DIAGNOSIS — I10 BENIGN ESSENTIAL HTN: ICD-10-CM

## 2021-11-05 DIAGNOSIS — R00.1 BRADYCARDIA: ICD-10-CM

## 2021-11-05 DIAGNOSIS — F02.80 ALZHEIMER'S DEMENTIA WITHOUT BEHAVIORAL DISTURBANCE, UNSPECIFIED TIMING OF DEMENTIA ONSET: ICD-10-CM

## 2021-11-05 DIAGNOSIS — E78.5 HYPERLIPIDEMIA WITH TARGET LDL LESS THAN 70: ICD-10-CM

## 2021-11-05 DIAGNOSIS — R25.1 TREMORS OF NERVOUS SYSTEM: ICD-10-CM

## 2021-11-05 DIAGNOSIS — R56.9 SEIZURES: ICD-10-CM

## 2021-11-05 DIAGNOSIS — I48.91 ATRIAL FIBRILLATION, UNSPECIFIED TYPE: ICD-10-CM

## 2021-11-05 DIAGNOSIS — Z86.69 STATUS POST SEIZURE: ICD-10-CM

## 2021-11-05 DIAGNOSIS — Z71.89 ADVANCE CARE PLANNING: ICD-10-CM

## 2021-11-05 LAB
AMPHET+METHAMPHET UR QL: NEGATIVE
BARBITURATES UR QL SCN>200 NG/ML: NEGATIVE
BENZODIAZ UR QL SCN>200 NG/ML: NEGATIVE
BZE UR QL SCN: NEGATIVE
CANNABINOIDS UR QL SCN: NEGATIVE
CREAT UR-MCNC: 112.8 MG/DL (ref 23–375)
ESTIMATED AVG GLUCOSE: 88 MG/DL (ref 68–131)
HBA1C MFR BLD: 4.7 % (ref 4–5.6)
METHADONE UR QL SCN>300 NG/ML: NEGATIVE
OPIATES UR QL SCN: NEGATIVE
PCP UR QL SCN>25 NG/ML: NEGATIVE
POCT GLUCOSE: 121 MG/DL (ref 70–110)
POCT GLUCOSE: 130 MG/DL (ref 70–110)
POCT GLUCOSE: 147 MG/DL (ref 70–110)
POCT GLUCOSE: 56 MG/DL (ref 70–110)
POCT GLUCOSE: 59 MG/DL (ref 70–110)
POCT GLUCOSE: 60 MG/DL (ref 70–110)
POCT GLUCOSE: 75 MG/DL (ref 70–110)
SARS-COV-2 RDRP RESP QL NAA+PROBE: NEGATIVE
TOXICOLOGY INFORMATION: NORMAL

## 2021-11-05 PROCEDURE — 25000003 PHARM REV CODE 250: Performed by: HOSPITALIST

## 2021-11-05 PROCEDURE — 25000003 PHARM REV CODE 250: Performed by: NURSE PRACTITIONER

## 2021-11-05 PROCEDURE — 99220 PR INITIAL OBSERVATION CARE,LEVL III: CPT | Mod: ,,, | Performed by: HOSPITALIST

## 2021-11-05 PROCEDURE — 99215 OFFICE O/P EST HI 40 MIN: CPT | Mod: ,,, | Performed by: STUDENT IN AN ORGANIZED HEALTH CARE EDUCATION/TRAINING PROGRAM

## 2021-11-05 PROCEDURE — 36415 COLL VENOUS BLD VENIPUNCTURE: CPT | Performed by: NURSE PRACTITIONER

## 2021-11-05 PROCEDURE — 83036 HEMOGLOBIN GLYCOSYLATED A1C: CPT | Performed by: NURSE PRACTITIONER

## 2021-11-05 PROCEDURE — 27000221 HC OXYGEN, UP TO 24 HOURS

## 2021-11-05 PROCEDURE — 95819 PR EEG,W/AWAKE & ASLEEP RECORD: ICD-10-PCS | Mod: 26,,, | Performed by: PSYCHIATRY & NEUROLOGY

## 2021-11-05 PROCEDURE — 99497 ADVNCD CARE PLAN 30 MIN: CPT | Mod: 25,,, | Performed by: INTERNAL MEDICINE

## 2021-11-05 PROCEDURE — 25000003 PHARM REV CODE 250: Performed by: STUDENT IN AN ORGANIZED HEALTH CARE EDUCATION/TRAINING PROGRAM

## 2021-11-05 PROCEDURE — G0378 HOSPITAL OBSERVATION PER HR: HCPCS

## 2021-11-05 PROCEDURE — 95819 EEG AWAKE AND ASLEEP: CPT | Mod: 26,,, | Performed by: PSYCHIATRY & NEUROLOGY

## 2021-11-05 PROCEDURE — 99205 OFFICE O/P NEW HI 60 MIN: CPT | Mod: ,,, | Performed by: INTERNAL MEDICINE

## 2021-11-05 PROCEDURE — 80307 DRUG TEST PRSMV CHEM ANLYZR: CPT | Performed by: NURSE PRACTITIONER

## 2021-11-05 PROCEDURE — 96374 THER/PROPH/DIAG INJ IV PUSH: CPT

## 2021-11-05 PROCEDURE — 63600175 PHARM REV CODE 636 W HCPCS: Performed by: HOSPITALIST

## 2021-11-05 PROCEDURE — 99215 PR OFFICE/OUTPT VISIT, EST, LEVL V, 40-54 MIN: ICD-10-PCS | Mod: ,,, | Performed by: STUDENT IN AN ORGANIZED HEALTH CARE EDUCATION/TRAINING PROGRAM

## 2021-11-05 PROCEDURE — 99220 PR INITIAL OBSERVATION CARE,LEVL III: ICD-10-PCS | Mod: ,,, | Performed by: HOSPITALIST

## 2021-11-05 PROCEDURE — 99205 PR OFFICE/OUTPT VISIT, NEW, LEVL V, 60-74 MIN: ICD-10-PCS | Mod: ,,, | Performed by: INTERNAL MEDICINE

## 2021-11-05 PROCEDURE — G0379 DIRECT REFER HOSPITAL OBSERV: HCPCS

## 2021-11-05 PROCEDURE — 94761 N-INVAS EAR/PLS OXIMETRY MLT: CPT

## 2021-11-05 PROCEDURE — U0002 COVID-19 LAB TEST NON-CDC: HCPCS | Performed by: STUDENT IN AN ORGANIZED HEALTH CARE EDUCATION/TRAINING PROGRAM

## 2021-11-05 PROCEDURE — 99497 PR ADVNCD CARE PLAN 30 MIN: ICD-10-PCS | Mod: 25,,, | Performed by: INTERNAL MEDICINE

## 2021-11-05 PROCEDURE — 95812 EEG 41-60 MINUTES: CPT

## 2021-11-05 RX ORDER — INSULIN ASPART 100 [IU]/ML
1-10 INJECTION, SOLUTION INTRAVENOUS; SUBCUTANEOUS
Status: DISCONTINUED | OUTPATIENT
Start: 2021-11-05 | End: 2021-11-05

## 2021-11-05 RX ORDER — METOPROLOL SUCCINATE 25 MG/1
25 TABLET, EXTENDED RELEASE ORAL DAILY
Status: DISCONTINUED | OUTPATIENT
Start: 2021-11-05 | End: 2021-11-05

## 2021-11-05 RX ORDER — SODIUM CHLORIDE 0.9 % (FLUSH) 0.9 %
10 SYRINGE (ML) INJECTION EVERY 8 HOURS PRN
Status: DISCONTINUED | OUTPATIENT
Start: 2021-11-05 | End: 2021-11-06 | Stop reason: HOSPADM

## 2021-11-05 RX ORDER — PANTOPRAZOLE SODIUM 40 MG/1
40 TABLET, DELAYED RELEASE ORAL DAILY
Refills: 0 | Status: DISCONTINUED | OUTPATIENT
Start: 2021-11-05 | End: 2021-11-06 | Stop reason: HOSPADM

## 2021-11-05 RX ORDER — GADOBUTROL 604.72 MG/ML
6.5 INJECTION INTRAVENOUS
Status: DISCONTINUED | OUTPATIENT
Start: 2021-11-05 | End: 2021-11-05

## 2021-11-05 RX ORDER — ONDANSETRON 8 MG/1
8 TABLET, ORALLY DISINTEGRATING ORAL EVERY 8 HOURS PRN
Status: DISCONTINUED | OUTPATIENT
Start: 2021-11-05 | End: 2021-11-06 | Stop reason: HOSPADM

## 2021-11-05 RX ORDER — GLUCAGON 1 MG
1 KIT INJECTION
Status: DISCONTINUED | OUTPATIENT
Start: 2021-11-05 | End: 2021-11-06 | Stop reason: HOSPADM

## 2021-11-05 RX ORDER — NITROGLYCERIN 0.4 MG/1
0.4 TABLET SUBLINGUAL
Status: DISCONTINUED | OUTPATIENT
Start: 2021-11-05 | End: 2021-11-06 | Stop reason: HOSPADM

## 2021-11-05 RX ORDER — IBUPROFEN 200 MG
24 TABLET ORAL
Status: DISCONTINUED | OUTPATIENT
Start: 2021-11-05 | End: 2021-11-06 | Stop reason: HOSPADM

## 2021-11-05 RX ORDER — ACETAMINOPHEN 325 MG/1
650 TABLET ORAL EVERY 4 HOURS PRN
Status: DISCONTINUED | OUTPATIENT
Start: 2021-11-05 | End: 2021-11-06 | Stop reason: HOSPADM

## 2021-11-05 RX ORDER — LEVETIRACETAM 500 MG/1
500 TABLET ORAL 2 TIMES DAILY
Status: DISCONTINUED | OUTPATIENT
Start: 2021-11-05 | End: 2021-11-06 | Stop reason: HOSPADM

## 2021-11-05 RX ORDER — CARVEDILOL 3.12 MG/1
3.12 TABLET ORAL 2 TIMES DAILY
Status: DISCONTINUED | OUTPATIENT
Start: 2021-11-05 | End: 2021-11-06 | Stop reason: HOSPADM

## 2021-11-05 RX ORDER — DONEPEZIL HYDROCHLORIDE 5 MG/1
10 TABLET, FILM COATED ORAL NIGHTLY
Status: DISCONTINUED | OUTPATIENT
Start: 2021-11-05 | End: 2021-11-06 | Stop reason: HOSPADM

## 2021-11-05 RX ORDER — AMIODARONE HYDROCHLORIDE 200 MG/1
200 TABLET ORAL DAILY
Status: DISCONTINUED | OUTPATIENT
Start: 2021-11-05 | End: 2021-11-06 | Stop reason: HOSPADM

## 2021-11-05 RX ORDER — IBUPROFEN 200 MG
16 TABLET ORAL
Status: DISCONTINUED | OUTPATIENT
Start: 2021-11-05 | End: 2021-11-06 | Stop reason: HOSPADM

## 2021-11-05 RX ORDER — LEVOTHYROXINE SODIUM 50 UG/1
50 TABLET ORAL DAILY
Status: DISCONTINUED | OUTPATIENT
Start: 2021-11-05 | End: 2021-11-06 | Stop reason: HOSPADM

## 2021-11-05 RX ORDER — LORAZEPAM 2 MG/ML
1 INJECTION INTRAMUSCULAR ONCE
Status: COMPLETED | OUTPATIENT
Start: 2021-11-05 | End: 2021-11-05

## 2021-11-05 RX ORDER — LANOLIN ALCOHOL/MO/W.PET/CERES
400 CREAM (GRAM) TOPICAL 2 TIMES DAILY
Status: DISCONTINUED | OUTPATIENT
Start: 2021-11-05 | End: 2021-11-06 | Stop reason: HOSPADM

## 2021-11-05 RX ORDER — INSULIN ASPART 100 [IU]/ML
0-5 INJECTION, SOLUTION INTRAVENOUS; SUBCUTANEOUS
Status: DISCONTINUED | OUTPATIENT
Start: 2021-11-05 | End: 2021-11-06 | Stop reason: HOSPADM

## 2021-11-05 RX ORDER — NALOXONE HCL 0.4 MG/ML
0.02 VIAL (ML) INJECTION
Status: DISCONTINUED | OUTPATIENT
Start: 2021-11-05 | End: 2021-11-06 | Stop reason: HOSPADM

## 2021-11-05 RX ORDER — PRAVASTATIN SODIUM 20 MG/1
40 TABLET ORAL NIGHTLY
Status: DISCONTINUED | OUTPATIENT
Start: 2021-11-05 | End: 2021-11-06 | Stop reason: HOSPADM

## 2021-11-05 RX ADMIN — SODIUM CHLORIDE 500 ML: 0.9 INJECTION, SOLUTION INTRAVENOUS at 12:11

## 2021-11-05 RX ADMIN — SACUBITRIL AND VALSARTAN 1 TABLET: 24; 26 TABLET, FILM COATED ORAL at 08:11

## 2021-11-05 RX ADMIN — RIVAROXABAN 15 MG: 15 TABLET, FILM COATED ORAL at 04:11

## 2021-11-05 RX ADMIN — PRAVASTATIN SODIUM 40 MG: 20 TABLET ORAL at 08:11

## 2021-11-05 RX ADMIN — LEVETIRACETAM 500 MG: 500 TABLET ORAL at 08:11

## 2021-11-05 RX ADMIN — Medication 400 MG: at 08:11

## 2021-11-05 RX ADMIN — PRAVASTATIN SODIUM 40 MG: 20 TABLET ORAL at 04:11

## 2021-11-05 RX ADMIN — LORAZEPAM 1 MG: 2 INJECTION INTRAMUSCULAR; INTRAVENOUS at 11:11

## 2021-11-05 RX ADMIN — PANTOPRAZOLE SODIUM 40 MG: 40 TABLET, DELAYED RELEASE ORAL at 08:11

## 2021-11-05 RX ADMIN — AMIODARONE HYDROCHLORIDE 200 MG: 200 TABLET ORAL at 08:11

## 2021-11-05 RX ADMIN — CARVEDILOL 3.12 MG: 3.12 TABLET, FILM COATED ORAL at 08:11

## 2021-11-05 RX ADMIN — DONEPEZIL HYDROCHLORIDE 10 MG: 5 TABLET, FILM COATED ORAL at 08:11

## 2021-11-05 RX ADMIN — LEVOTHYROXINE SODIUM 50 MCG: 50 TABLET ORAL at 08:11

## 2021-11-06 VITALS
TEMPERATURE: 98 F | HEIGHT: 66 IN | RESPIRATION RATE: 14 BRPM | WEIGHT: 147.25 LBS | OXYGEN SATURATION: 92 % | BODY MASS INDEX: 23.66 KG/M2 | DIASTOLIC BLOOD PRESSURE: 92 MMHG | HEART RATE: 62 BPM | SYSTOLIC BLOOD PRESSURE: 131 MMHG

## 2021-11-06 LAB
ALBUMIN SERPL BCP-MCNC: 2.6 G/DL (ref 3.5–5.2)
ALP SERPL-CCNC: 88 U/L (ref 55–135)
ALT SERPL W/O P-5'-P-CCNC: 15 U/L (ref 10–44)
ANION GAP SERPL CALC-SCNC: 8 MMOL/L (ref 8–16)
AST SERPL-CCNC: 13 U/L (ref 10–40)
BASOPHILS # BLD AUTO: 0.06 K/UL (ref 0–0.2)
BASOPHILS NFR BLD: 0.9 % (ref 0–1.9)
BILIRUB SERPL-MCNC: 0.3 MG/DL (ref 0.1–1)
BUN SERPL-MCNC: 16 MG/DL (ref 8–23)
CALCIUM SERPL-MCNC: 8.8 MG/DL (ref 8.7–10.5)
CHLORIDE SERPL-SCNC: 107 MMOL/L (ref 95–110)
CO2 SERPL-SCNC: 23 MMOL/L (ref 23–29)
CREAT SERPL-MCNC: 0.9 MG/DL (ref 0.5–1.4)
DIFFERENTIAL METHOD: ABNORMAL
EOSINOPHIL # BLD AUTO: 0.2 K/UL (ref 0–0.5)
EOSINOPHIL NFR BLD: 3.6 % (ref 0–8)
ERYTHROCYTE [DISTWIDTH] IN BLOOD BY AUTOMATED COUNT: 18.8 % (ref 11.5–14.5)
EST. GFR  (AFRICAN AMERICAN): >60 ML/MIN/1.73 M^2
EST. GFR  (NON AFRICAN AMERICAN): >60 ML/MIN/1.73 M^2
GLUCOSE SERPL-MCNC: 144 MG/DL (ref 70–110)
HCT VFR BLD AUTO: 32.7 % (ref 40–54)
HGB BLD-MCNC: 10.2 G/DL (ref 14–18)
IMM GRANULOCYTES # BLD AUTO: 0.02 K/UL (ref 0–0.04)
IMM GRANULOCYTES NFR BLD AUTO: 0.3 % (ref 0–0.5)
LYMPHOCYTES # BLD AUTO: 1.4 K/UL (ref 1–4.8)
LYMPHOCYTES NFR BLD: 21 % (ref 18–48)
MAGNESIUM SERPL-MCNC: 1.9 MG/DL (ref 1.6–2.6)
MCH RBC QN AUTO: 26.5 PG (ref 27–31)
MCHC RBC AUTO-ENTMCNC: 31.2 G/DL (ref 32–36)
MCV RBC AUTO: 85 FL (ref 82–98)
MONOCYTES # BLD AUTO: 0.6 K/UL (ref 0.3–1)
MONOCYTES NFR BLD: 8.5 % (ref 4–15)
NEUTROPHILS # BLD AUTO: 4.4 K/UL (ref 1.8–7.7)
NEUTROPHILS NFR BLD: 65.7 % (ref 38–73)
NRBC BLD-RTO: 0 /100 WBC
PHOSPHATE SERPL-MCNC: 3.6 MG/DL (ref 2.7–4.5)
PLATELET # BLD AUTO: 266 K/UL (ref 150–450)
PMV BLD AUTO: 10.3 FL (ref 9.2–12.9)
POCT GLUCOSE: 159 MG/DL (ref 70–110)
POCT GLUCOSE: 273 MG/DL (ref 70–110)
POTASSIUM SERPL-SCNC: 4.4 MMOL/L (ref 3.5–5.1)
PROT SERPL-MCNC: 6 G/DL (ref 6–8.4)
RBC # BLD AUTO: 3.85 M/UL (ref 4.6–6.2)
SODIUM SERPL-SCNC: 138 MMOL/L (ref 136–145)
WBC # BLD AUTO: 6.71 K/UL (ref 3.9–12.7)

## 2021-11-06 PROCEDURE — 99217 PR OBSERVATION CARE DISCHARGE: CPT | Mod: ,,, | Performed by: HOSPITALIST

## 2021-11-06 PROCEDURE — 99217 PR OBSERVATION CARE DISCHARGE: ICD-10-PCS | Mod: ,,, | Performed by: HOSPITALIST

## 2021-11-06 PROCEDURE — 25000003 PHARM REV CODE 250: Performed by: NURSE PRACTITIONER

## 2021-11-06 PROCEDURE — 80053 COMPREHEN METABOLIC PANEL: CPT | Performed by: NURSE PRACTITIONER

## 2021-11-06 PROCEDURE — 36415 COLL VENOUS BLD VENIPUNCTURE: CPT | Performed by: NURSE PRACTITIONER

## 2021-11-06 PROCEDURE — 83735 ASSAY OF MAGNESIUM: CPT | Performed by: NURSE PRACTITIONER

## 2021-11-06 PROCEDURE — G0378 HOSPITAL OBSERVATION PER HR: HCPCS

## 2021-11-06 PROCEDURE — 25000003 PHARM REV CODE 250: Performed by: HOSPITALIST

## 2021-11-06 PROCEDURE — 84100 ASSAY OF PHOSPHORUS: CPT | Performed by: NURSE PRACTITIONER

## 2021-11-06 PROCEDURE — 85025 COMPLETE CBC W/AUTO DIFF WBC: CPT | Performed by: NURSE PRACTITIONER

## 2021-11-06 RX ORDER — CARVEDILOL 3.12 MG/1
3.12 TABLET ORAL 2 TIMES DAILY
Qty: 60 TABLET | Refills: 11
Start: 2021-11-06 | End: 2022-11-06

## 2021-11-06 RX ORDER — LEVETIRACETAM 500 MG/1
500 TABLET ORAL 2 TIMES DAILY
Qty: 60 TABLET | Refills: 11
Start: 2021-11-06 | End: 2022-11-06

## 2021-11-06 RX ADMIN — Medication 400 MG: at 08:11

## 2021-11-06 RX ADMIN — SACUBITRIL AND VALSARTAN 1 TABLET: 24; 26 TABLET, FILM COATED ORAL at 08:11

## 2021-11-06 RX ADMIN — AMIODARONE HYDROCHLORIDE 200 MG: 200 TABLET ORAL at 08:11

## 2021-11-06 RX ADMIN — CARVEDILOL 3.12 MG: 3.12 TABLET, FILM COATED ORAL at 08:11

## 2021-11-06 RX ADMIN — LEVETIRACETAM 500 MG: 500 TABLET ORAL at 08:11

## 2021-11-06 RX ADMIN — PANTOPRAZOLE SODIUM 40 MG: 40 TABLET, DELAYED RELEASE ORAL at 08:11

## 2021-11-06 RX ADMIN — LEVOTHYROXINE SODIUM 50 MCG: 50 TABLET ORAL at 08:11

## 2021-11-11 LAB — BACTERIA BLD CULT: NORMAL

## 2022-03-04 ENCOUNTER — APPOINTMENT (OUTPATIENT)
Dept: LAB | Facility: HOSPITAL | Age: 82
End: 2022-03-04
Attending: HOSPITALIST
Payer: COMMERCIAL

## 2022-03-04 DIAGNOSIS — N39.0 URINARY TRACT INFECTION, SITE NOT SPECIFIED: Primary | ICD-10-CM

## 2022-03-04 LAB
BILIRUB UR QL STRIP: NEGATIVE
CLARITY UR: CLEAR
COLOR UR: YELLOW
GLUCOSE UR QL STRIP: NEGATIVE
HGB UR QL STRIP: NEGATIVE
KETONES UR QL STRIP: NEGATIVE
LEUKOCYTE ESTERASE UR QL STRIP: NEGATIVE
NITRITE UR QL STRIP: NEGATIVE
PH UR STRIP: 5 [PH] (ref 5–8)
PROT UR QL STRIP: NEGATIVE
SP GR UR STRIP: 1.01 (ref 1–1.03)
URN SPEC COLLECT METH UR: NORMAL
UROBILINOGEN UR STRIP-ACNC: NEGATIVE EU/DL

## 2022-03-04 PROCEDURE — 81003 URINALYSIS AUTO W/O SCOPE: CPT | Performed by: HOSPITALIST

## 2022-03-04 PROCEDURE — 87086 URINE CULTURE/COLONY COUNT: CPT

## 2022-03-06 LAB — BACTERIA UR CULT: NORMAL

## 2022-05-04 ENCOUNTER — HOSPITAL ENCOUNTER (OUTPATIENT)
Facility: HOSPITAL | Age: 82
Discharge: HOME | DRG: 179 | End: 2022-05-06
Attending: STUDENT IN AN ORGANIZED HEALTH CARE EDUCATION/TRAINING PROGRAM | Admitting: FAMILY MEDICINE
Payer: COMMERCIAL

## 2022-05-04 DIAGNOSIS — R94.31 PROLONGED Q-T INTERVAL ON ECG: ICD-10-CM

## 2022-05-04 DIAGNOSIS — R09.02 HYPOXEMIA: ICD-10-CM

## 2022-05-04 DIAGNOSIS — E16.2 HYPOGLYCEMIA: ICD-10-CM

## 2022-05-04 DIAGNOSIS — E11.649 HYPOGLYCEMIA ASSOCIATED WITH TYPE 2 DIABETES MELLITUS: ICD-10-CM

## 2022-05-04 DIAGNOSIS — R79.89 ELEVATED D-DIMER: ICD-10-CM

## 2022-05-04 DIAGNOSIS — J69.0 ASPIRATION PNEUMONIA OF BOTH LOWER LOBES, UNSPECIFIED ASPIRATION PNEUMONIA TYPE: Primary | ICD-10-CM

## 2022-05-04 LAB
ALBUMIN SERPL BCP-MCNC: 2.5 G/DL (ref 3.5–5.2)
ALLENS TEST: ABNORMAL
ALLENS TEST: ABNORMAL
ALP SERPL-CCNC: 84 U/L (ref 55–135)
ALT SERPL W/O P-5'-P-CCNC: 15 U/L (ref 10–44)
ANION GAP SERPL CALC-SCNC: 14 MMOL/L (ref 8–16)
AST SERPL-CCNC: 16 U/L (ref 10–40)
BASOPHILS # BLD AUTO: 0.08 K/UL (ref 0–0.2)
BASOPHILS NFR BLD: 0.7 % (ref 0–1.9)
BILIRUB SERPL-MCNC: 0.3 MG/DL (ref 0.1–1)
BILIRUB UR QL STRIP: NEGATIVE
BNP SERPL-MCNC: 76 PG/ML (ref 0–99)
BUN SERPL-MCNC: 22 MG/DL (ref 8–23)
CALCIUM SERPL-MCNC: 8.5 MG/DL (ref 8.7–10.5)
CHLORIDE SERPL-SCNC: 103 MMOL/L (ref 95–110)
CLARITY UR: CLEAR
CO2 SERPL-SCNC: 22 MMOL/L (ref 23–29)
COLOR UR: YELLOW
CREAT SERPL-MCNC: 1.1 MG/DL (ref 0.5–1.4)
D DIMER PPP IA.FEU-MCNC: 0.78 MG/L FEU
DELSYS: ABNORMAL
DELSYS: ABNORMAL
DIFFERENTIAL METHOD: ABNORMAL
EOSINOPHIL # BLD AUTO: 0.2 K/UL (ref 0–0.5)
EOSINOPHIL NFR BLD: 1.3 % (ref 0–8)
ERYTHROCYTE [DISTWIDTH] IN BLOOD BY AUTOMATED COUNT: 15.2 % (ref 11.5–14.5)
EST. GFR  (AFRICAN AMERICAN): >60 ML/MIN/1.73 M^2
EST. GFR  (NON AFRICAN AMERICAN): >60 ML/MIN/1.73 M^2
FIO2: 21 (ref 21–100)
FIO2: 28 (ref 21–100)
GLUCOSE SERPL-MCNC: 179 MG/DL (ref 70–110)
GLUCOSE UR QL STRIP: ABNORMAL
HCO3 UR-SCNC: 24.7 MMOL/L
HCO3 UR-SCNC: 24.8 MMOL/L
HCT VFR BLD AUTO: 36.9 % (ref 40–54)
HGB BLD-MCNC: 11.5 G/DL (ref 14–18)
HGB UR QL STRIP: NEGATIVE
IMM GRANULOCYTES # BLD AUTO: 0.04 K/UL (ref 0–0.04)
IMM GRANULOCYTES NFR BLD AUTO: 0.4 % (ref 0–0.5)
INFLUENZA A, MOLECULAR: NEGATIVE
INFLUENZA B, MOLECULAR: NEGATIVE
KETONES UR QL STRIP: NEGATIVE
LEUKOCYTE ESTERASE UR QL STRIP: NEGATIVE
LYMPHOCYTES # BLD AUTO: 1 K/UL (ref 1–4.8)
LYMPHOCYTES NFR BLD: 8.7 % (ref 18–48)
MAGNESIUM SERPL-MCNC: 1.8 MG/DL (ref 1.6–2.6)
MCH RBC QN AUTO: 27.8 PG (ref 27–31)
MCHC RBC AUTO-ENTMCNC: 31.2 G/DL (ref 32–36)
MCV RBC AUTO: 89 FL (ref 82–98)
MONOCYTES # BLD AUTO: 0.5 K/UL (ref 0.3–1)
MONOCYTES NFR BLD: 4.3 % (ref 4–15)
NEUTROPHILS # BLD AUTO: 9.4 K/UL (ref 1.8–7.7)
NEUTROPHILS NFR BLD: 84.6 % (ref 38–73)
NITRITE UR QL STRIP: NEGATIVE
NRBC BLD-RTO: 0 /100 WBC
PCO2 BLDA: 39 MMHG (ref 35–45)
PCO2 BLDA: 40 MMHG (ref 35–45)
PH SMN: 7.4 [PH] (ref 7.35–7.45)
PH SMN: 7.41 [PH] (ref 7.35–7.45)
PH UR STRIP: 6 [PH] (ref 5–8)
PLATELET # BLD AUTO: 298 K/UL (ref 150–450)
PMV BLD AUTO: 9.6 FL (ref 9.2–12.9)
PO2 BLDA: 54 MMHG (ref 75–100)
PO2 BLDA: 75 MMHG (ref 75–100)
POC BE: 0 MMOL/L (ref -2–2)
POC BE: 0.1 MMOL/L (ref -2–2)
POC COHB: 0.5 % (ref 0–3)
POC COHB: 0.6 % (ref 0–3)
POC METHB: 0.1 % (ref 0–1.5)
POC METHB: 0.2 % (ref 0–1.5)
POC O2HB ARTERIAL: 87.4 % (ref 94–100)
POC O2HB ARTERIAL: 94.2 % (ref 94–100)
POC SATURATED O2: 88.2 % (ref 90–100)
POC SATURATED O2: 94.8 % (ref 90–100)
POC TCO2: 25.9 MMOL/L
POC TCO2: 26 MMOL/L
POC THB: 10.7 G/DL (ref 12–18)
POC THB: 10.8 G/DL (ref 12–18)
POCT GLUCOSE: 202 MG/DL (ref 70–110)
POTASSIUM SERPL-SCNC: 3.9 MMOL/L (ref 3.5–5.1)
PROT SERPL-MCNC: 6.1 G/DL (ref 6–8.4)
PROT UR QL STRIP: NEGATIVE
RBC # BLD AUTO: 4.14 M/UL (ref 4.6–6.2)
SARS-COV-2 RDRP RESP QL NAA+PROBE: NEGATIVE
SITE: ABNORMAL
SITE: ABNORMAL
SODIUM SERPL-SCNC: 139 MMOL/L (ref 136–145)
SP GR UR STRIP: >=1.03 (ref 1–1.03)
SPECIMEN SOURCE: NORMAL
TROPONIN I SERPL DL<=0.01 NG/ML-MCNC: <0.006 NG/ML (ref 0–0.03)
URN SPEC COLLECT METH UR: ABNORMAL
UROBILINOGEN UR STRIP-ACNC: NEGATIVE EU/DL
WBC # BLD AUTO: 11.13 K/UL (ref 3.9–12.7)

## 2022-05-04 PROCEDURE — 99291 CRITICAL CARE FIRST HOUR: CPT | Mod: 25

## 2022-05-04 PROCEDURE — 96366 THER/PROPH/DIAG IV INF ADDON: CPT

## 2022-05-04 PROCEDURE — 82803 BLOOD GASES ANY COMBINATION: CPT | Performed by: STUDENT IN AN ORGANIZED HEALTH CARE EDUCATION/TRAINING PROGRAM

## 2022-05-04 PROCEDURE — 81003 URINALYSIS AUTO W/O SCOPE: CPT | Performed by: STUDENT IN AN ORGANIZED HEALTH CARE EDUCATION/TRAINING PROGRAM

## 2022-05-04 PROCEDURE — U0002 COVID-19 LAB TEST NON-CDC: HCPCS | Performed by: STUDENT IN AN ORGANIZED HEALTH CARE EDUCATION/TRAINING PROGRAM

## 2022-05-04 PROCEDURE — 25500020 PHARM REV CODE 255: Performed by: STUDENT IN AN ORGANIZED HEALTH CARE EDUCATION/TRAINING PROGRAM

## 2022-05-04 PROCEDURE — 87502 INFLUENZA DNA AMP PROBE: CPT | Performed by: STUDENT IN AN ORGANIZED HEALTH CARE EDUCATION/TRAINING PROGRAM

## 2022-05-04 PROCEDURE — 36600 WITHDRAWAL OF ARTERIAL BLOOD: CPT

## 2022-05-04 PROCEDURE — 83735 ASSAY OF MAGNESIUM: CPT | Performed by: STUDENT IN AN ORGANIZED HEALTH CARE EDUCATION/TRAINING PROGRAM

## 2022-05-04 PROCEDURE — 36415 COLL VENOUS BLD VENIPUNCTURE: CPT | Performed by: STUDENT IN AN ORGANIZED HEALTH CARE EDUCATION/TRAINING PROGRAM

## 2022-05-04 PROCEDURE — 93010 EKG 12-LEAD: ICD-10-PCS | Mod: ,,, | Performed by: INTERNAL MEDICINE

## 2022-05-04 PROCEDURE — 85379 FIBRIN DEGRADATION QUANT: CPT | Performed by: STUDENT IN AN ORGANIZED HEALTH CARE EDUCATION/TRAINING PROGRAM

## 2022-05-04 PROCEDURE — 82962 GLUCOSE BLOOD TEST: CPT

## 2022-05-04 PROCEDURE — 27000221 HC OXYGEN, UP TO 24 HOURS

## 2022-05-04 PROCEDURE — 99900035 HC TECH TIME PER 15 MIN (STAT)

## 2022-05-04 PROCEDURE — 83880 ASSAY OF NATRIURETIC PEPTIDE: CPT | Performed by: STUDENT IN AN ORGANIZED HEALTH CARE EDUCATION/TRAINING PROGRAM

## 2022-05-04 PROCEDURE — 80053 COMPREHEN METABOLIC PANEL: CPT | Performed by: STUDENT IN AN ORGANIZED HEALTH CARE EDUCATION/TRAINING PROGRAM

## 2022-05-04 PROCEDURE — 63600175 PHARM REV CODE 636 W HCPCS: Performed by: STUDENT IN AN ORGANIZED HEALTH CARE EDUCATION/TRAINING PROGRAM

## 2022-05-04 PROCEDURE — 93010 ELECTROCARDIOGRAM REPORT: CPT | Mod: ,,, | Performed by: INTERNAL MEDICINE

## 2022-05-04 PROCEDURE — 84484 ASSAY OF TROPONIN QUANT: CPT | Performed by: STUDENT IN AN ORGANIZED HEALTH CARE EDUCATION/TRAINING PROGRAM

## 2022-05-04 PROCEDURE — 85025 COMPLETE CBC W/AUTO DIFF WBC: CPT | Performed by: STUDENT IN AN ORGANIZED HEALTH CARE EDUCATION/TRAINING PROGRAM

## 2022-05-04 PROCEDURE — 93005 ELECTROCARDIOGRAM TRACING: CPT

## 2022-05-04 PROCEDURE — 96365 THER/PROPH/DIAG IV INF INIT: CPT

## 2022-05-04 RX ORDER — MAGNESIUM SULFATE HEPTAHYDRATE 40 MG/ML
2 INJECTION, SOLUTION INTRAVENOUS
Status: COMPLETED | OUTPATIENT
Start: 2022-05-04 | End: 2022-05-04

## 2022-05-04 RX ORDER — METRONIDAZOLE 500 MG/100ML
500 INJECTION, SOLUTION INTRAVENOUS DAILY
Status: DISCONTINUED | OUTPATIENT
Start: 2022-05-04 | End: 2022-05-05

## 2022-05-04 RX ORDER — LEVOFLOXACIN 5 MG/ML
750 INJECTION, SOLUTION INTRAVENOUS
Status: DISCONTINUED | OUTPATIENT
Start: 2022-05-04 | End: 2022-05-05

## 2022-05-04 RX ADMIN — MAGNESIUM SULFATE 2 G: 2 INJECTION INTRAVENOUS at 08:05

## 2022-05-04 RX ADMIN — IOHEXOL 75 ML: 350 INJECTION, SOLUTION INTRAVENOUS at 10:05

## 2022-05-04 NOTE — Clinical Note
Diagnosis: Aspiration pneumonia of both lower lobes, unspecified aspiration pneumonia type [[id number]]   Future Attending Provider: LEANDRO DENNIS [[id number]]   Admitting Provider:: LEANDRO DENNIS [[id number]]

## 2022-05-05 PROBLEM — J98.4 PNEUMONITIS: Status: ACTIVE | Noted: 2022-05-05

## 2022-05-05 PROBLEM — E11.649 HYPOGLYCEMIA ASSOCIATED WITH TYPE 2 DIABETES MELLITUS: Status: ACTIVE | Noted: 2022-05-05

## 2022-05-05 PROBLEM — Z66 DNR (DO NOT RESUSCITATE): Status: ACTIVE | Noted: 2022-05-05

## 2022-05-05 LAB
ALBUMIN SERPL BCP-MCNC: 2.4 G/DL (ref 3.5–5.2)
ALP SERPL-CCNC: 81 U/L (ref 55–135)
ALT SERPL W/O P-5'-P-CCNC: 15 U/L (ref 10–44)
ANION GAP SERPL CALC-SCNC: 13 MMOL/L (ref 8–16)
AST SERPL-CCNC: 14 U/L (ref 10–40)
BASOPHILS # BLD AUTO: 0.03 K/UL (ref 0–0.2)
BASOPHILS NFR BLD: 0.3 % (ref 0–1.9)
BILIRUB SERPL-MCNC: 0.4 MG/DL (ref 0.1–1)
BUN SERPL-MCNC: 20 MG/DL (ref 8–23)
CALCIUM SERPL-MCNC: 8.6 MG/DL (ref 8.7–10.5)
CHLORIDE SERPL-SCNC: 103 MMOL/L (ref 95–110)
CO2 SERPL-SCNC: 23 MMOL/L (ref 23–29)
CREAT SERPL-MCNC: 0.8 MG/DL (ref 0.5–1.4)
DIFFERENTIAL METHOD: ABNORMAL
EOSINOPHIL # BLD AUTO: 0 K/UL (ref 0–0.5)
EOSINOPHIL NFR BLD: 0.2 % (ref 0–8)
ERYTHROCYTE [DISTWIDTH] IN BLOOD BY AUTOMATED COUNT: 15.2 % (ref 11.5–14.5)
EST. GFR  (AFRICAN AMERICAN): >60 ML/MIN/1.73 M^2
EST. GFR  (NON AFRICAN AMERICAN): >60 ML/MIN/1.73 M^2
ESTIMATED AVG GLUCOSE: 82 MG/DL (ref 68–131)
GLUCOSE SERPL-MCNC: 51 MG/DL (ref 70–110)
HBA1C MFR BLD: 4.5 % (ref 4–5.6)
HCT VFR BLD AUTO: 37.6 % (ref 40–54)
HGB BLD-MCNC: 11.7 G/DL (ref 14–18)
IMM GRANULOCYTES # BLD AUTO: 0.03 K/UL (ref 0–0.04)
IMM GRANULOCYTES NFR BLD AUTO: 0.3 % (ref 0–0.5)
LYMPHOCYTES # BLD AUTO: 0.8 K/UL (ref 1–4.8)
LYMPHOCYTES NFR BLD: 6.8 % (ref 18–48)
MCH RBC QN AUTO: 27.7 PG (ref 27–31)
MCHC RBC AUTO-ENTMCNC: 31.1 G/DL (ref 32–36)
MCV RBC AUTO: 89 FL (ref 82–98)
MONOCYTES # BLD AUTO: 0.4 K/UL (ref 0.3–1)
MONOCYTES NFR BLD: 3.7 % (ref 4–15)
NEUTROPHILS # BLD AUTO: 9.8 K/UL (ref 1.8–7.7)
NEUTROPHILS NFR BLD: 88.7 % (ref 38–73)
NRBC BLD-RTO: 0 /100 WBC
PLATELET # BLD AUTO: 307 K/UL (ref 150–450)
PMV BLD AUTO: 9.6 FL (ref 9.2–12.9)
POCT GLUCOSE: 100 MG/DL (ref 70–110)
POCT GLUCOSE: 142 MG/DL (ref 70–110)
POCT GLUCOSE: 147 MG/DL (ref 70–110)
POCT GLUCOSE: 242 MG/DL (ref 70–110)
POCT GLUCOSE: 300 MG/DL (ref 70–110)
POCT GLUCOSE: 54 MG/DL (ref 70–110)
POCT GLUCOSE: 60 MG/DL (ref 70–110)
POTASSIUM SERPL-SCNC: 4.6 MMOL/L (ref 3.5–5.1)
PROT SERPL-MCNC: 6 G/DL (ref 6–8.4)
RBC # BLD AUTO: 4.23 M/UL (ref 4.6–6.2)
SODIUM SERPL-SCNC: 139 MMOL/L (ref 136–145)
WBC # BLD AUTO: 11.01 K/UL (ref 3.9–12.7)

## 2022-05-05 PROCEDURE — 82962 GLUCOSE BLOOD TEST: CPT

## 2022-05-05 PROCEDURE — 96367 TX/PROPH/DG ADDL SEQ IV INF: CPT

## 2022-05-05 PROCEDURE — 25000003 PHARM REV CODE 250: Performed by: STUDENT IN AN ORGANIZED HEALTH CARE EDUCATION/TRAINING PROGRAM

## 2022-05-05 PROCEDURE — 85025 COMPLETE CBC W/AUTO DIFF WBC: CPT | Performed by: STUDENT IN AN ORGANIZED HEALTH CARE EDUCATION/TRAINING PROGRAM

## 2022-05-05 PROCEDURE — G0378 HOSPITAL OBSERVATION PER HR: HCPCS

## 2022-05-05 PROCEDURE — 36415 COLL VENOUS BLD VENIPUNCTURE: CPT | Performed by: STUDENT IN AN ORGANIZED HEALTH CARE EDUCATION/TRAINING PROGRAM

## 2022-05-05 PROCEDURE — 63600175 PHARM REV CODE 636 W HCPCS: Performed by: STUDENT IN AN ORGANIZED HEALTH CARE EDUCATION/TRAINING PROGRAM

## 2022-05-05 PROCEDURE — S0030 INJECTION, METRONIDAZOLE: HCPCS | Performed by: STUDENT IN AN ORGANIZED HEALTH CARE EDUCATION/TRAINING PROGRAM

## 2022-05-05 PROCEDURE — 96366 THER/PROPH/DIAG IV INF ADDON: CPT

## 2022-05-05 PROCEDURE — 94761 N-INVAS EAR/PLS OXIMETRY MLT: CPT

## 2022-05-05 PROCEDURE — 83036 HEMOGLOBIN GLYCOSYLATED A1C: CPT | Performed by: NURSE PRACTITIONER

## 2022-05-05 PROCEDURE — 99222 PR INITIAL HOSPITAL CARE,LEVL II: ICD-10-PCS | Mod: ,,, | Performed by: FAMILY MEDICINE

## 2022-05-05 PROCEDURE — 96368 THER/DIAG CONCURRENT INF: CPT

## 2022-05-05 PROCEDURE — 99222 1ST HOSP IP/OBS MODERATE 55: CPT | Mod: ,,, | Performed by: FAMILY MEDICINE

## 2022-05-05 PROCEDURE — 27000221 HC OXYGEN, UP TO 24 HOURS

## 2022-05-05 PROCEDURE — 11000001 HC ACUTE MED/SURG PRIVATE ROOM

## 2022-05-05 PROCEDURE — 80053 COMPREHEN METABOLIC PANEL: CPT | Performed by: STUDENT IN AN ORGANIZED HEALTH CARE EDUCATION/TRAINING PROGRAM

## 2022-05-05 PROCEDURE — 63600175 PHARM REV CODE 636 W HCPCS: Performed by: NURSE PRACTITIONER

## 2022-05-05 PROCEDURE — 96372 THER/PROPH/DIAG INJ SC/IM: CPT | Mod: 59 | Performed by: NURSE PRACTITIONER

## 2022-05-05 RX ORDER — LEVETIRACETAM 500 MG/1
500 TABLET ORAL 2 TIMES DAILY
Status: DISCONTINUED | OUTPATIENT
Start: 2022-05-05 | End: 2022-05-06 | Stop reason: HOSPADM

## 2022-05-05 RX ORDER — IBUPROFEN 200 MG
24 TABLET ORAL
Status: DISCONTINUED | OUTPATIENT
Start: 2022-05-05 | End: 2022-05-06 | Stop reason: HOSPADM

## 2022-05-05 RX ORDER — INSULIN ASPART 100 [IU]/ML
0-5 INJECTION, SOLUTION INTRAVENOUS; SUBCUTANEOUS
Status: DISCONTINUED | OUTPATIENT
Start: 2022-05-05 | End: 2022-05-06 | Stop reason: HOSPADM

## 2022-05-05 RX ORDER — GLUCAGON 1 MG
1 KIT INJECTION
Status: DISCONTINUED | OUTPATIENT
Start: 2022-05-05 | End: 2022-05-06 | Stop reason: HOSPADM

## 2022-05-05 RX ORDER — PRAVASTATIN SODIUM 40 MG/1
40 TABLET ORAL NIGHTLY
Status: DISCONTINUED | OUTPATIENT
Start: 2022-05-05 | End: 2022-05-06 | Stop reason: HOSPADM

## 2022-05-05 RX ORDER — TALC
3 POWDER (GRAM) TOPICAL NIGHTLY
Status: DISCONTINUED | OUTPATIENT
Start: 2022-05-05 | End: 2022-05-06 | Stop reason: HOSPADM

## 2022-05-05 RX ORDER — SODIUM CHLORIDE 0.9 % (FLUSH) 0.9 %
10 SYRINGE (ML) INJECTION
Status: DISCONTINUED | OUTPATIENT
Start: 2022-05-05 | End: 2022-05-06 | Stop reason: HOSPADM

## 2022-05-05 RX ORDER — CARVEDILOL 3.12 MG/1
3.12 TABLET ORAL 2 TIMES DAILY
Status: DISCONTINUED | OUTPATIENT
Start: 2022-05-05 | End: 2022-05-06 | Stop reason: HOSPADM

## 2022-05-05 RX ORDER — IBUPROFEN 200 MG
16 TABLET ORAL
Status: DISCONTINUED | OUTPATIENT
Start: 2022-05-05 | End: 2022-05-06 | Stop reason: HOSPADM

## 2022-05-05 RX ORDER — LEVOTHYROXINE SODIUM 50 UG/1
50 TABLET ORAL
Status: DISCONTINUED | OUTPATIENT
Start: 2022-05-05 | End: 2022-05-06 | Stop reason: HOSPADM

## 2022-05-05 RX ORDER — LEVOFLOXACIN 5 MG/ML
750 INJECTION, SOLUTION INTRAVENOUS
Status: DISCONTINUED | OUTPATIENT
Start: 2022-05-06 | End: 2022-05-06 | Stop reason: HOSPADM

## 2022-05-05 RX ORDER — PANTOPRAZOLE SODIUM 40 MG/1
40 TABLET, DELAYED RELEASE ORAL DAILY
Status: DISCONTINUED | OUTPATIENT
Start: 2022-05-05 | End: 2022-05-06 | Stop reason: HOSPADM

## 2022-05-05 RX ORDER — TALC
6 POWDER (GRAM) TOPICAL NIGHTLY PRN
Status: DISCONTINUED | OUTPATIENT
Start: 2022-05-05 | End: 2022-05-06 | Stop reason: HOSPADM

## 2022-05-05 RX ADMIN — PRAVASTATIN SODIUM 40 MG: 40 TABLET ORAL at 03:05

## 2022-05-05 RX ADMIN — INSULIN ASPART 3 UNITS: 100 INJECTION, SOLUTION INTRAVENOUS; SUBCUTANEOUS at 12:05

## 2022-05-05 RX ADMIN — LEVETIRACETAM 500 MG: 500 TABLET, FILM COATED ORAL at 08:05

## 2022-05-05 RX ADMIN — LEVOFLOXACIN 750 MG: 750 INJECTION, SOLUTION INTRAVENOUS at 12:05

## 2022-05-05 RX ADMIN — INSULIN ASPART 1 UNITS: 100 INJECTION, SOLUTION INTRAVENOUS; SUBCUTANEOUS at 09:05

## 2022-05-05 RX ADMIN — LEVOTHYROXINE SODIUM 50 MCG: 50 TABLET ORAL at 06:05

## 2022-05-05 RX ADMIN — PANTOPRAZOLE SODIUM 40 MG: 40 TABLET, DELAYED RELEASE ORAL at 08:05

## 2022-05-05 RX ADMIN — LEVOFLOXACIN 750 MG: 750 INJECTION, SOLUTION INTRAVENOUS at 11:05

## 2022-05-05 RX ADMIN — METRONIDAZOLE 500 MG: 500 INJECTION, SOLUTION INTRAVENOUS at 12:05

## 2022-05-05 RX ADMIN — CARVEDILOL 3.12 MG: 3.12 TABLET, FILM COATED ORAL at 08:05

## 2022-05-05 RX ADMIN — METRONIDAZOLE 500 MG: 500 INJECTION, SOLUTION INTRAVENOUS at 08:05

## 2022-05-05 RX ADMIN — Medication 3 MG: at 03:05

## 2022-05-05 RX ADMIN — PRAVASTATIN SODIUM 40 MG: 40 TABLET ORAL at 08:05

## 2022-05-05 RX ADMIN — Medication 3 MG: at 08:05

## 2022-05-05 NOTE — SUBJECTIVE & OBJECTIVE
Past Medical History:   Diagnosis Date    Alzheimer disease     Anticoagulant long-term use     Atrial fibrillation     CAD (coronary artery disease)     COPD (chronic obstructive pulmonary disease)     Dementia     Diabetes mellitus type II     GERD (gastroesophageal reflux disease)     Hyperlipidemia     Hypertension     Hypokalemia     Hypomagnesemia     Insomnia     MI (myocardial infarction)     x 2    Mitral valve disorder     Thyroid disease        Past Surgical History:   Procedure Laterality Date    COLONOSCOPY N/A 2/21/2021    Procedure: COLONOSCOPY;  Surgeon: Deepak Valente MD;  Location: [facility];  Service: Endoscopy;  Laterality: N/A;    CORONARY STENT PLACEMENT      KNEE SURGERY      left    SPINE SURGERY      c-spine       Review of patient's allergies indicates:  No Known Allergies    No current facility-administered medications on file prior to encounter.     Current Outpatient Medications on File Prior to Encounter   Medication Sig    acetaminophen (TYLENOL) 325 MG tablet Take 325 mg by mouth every 4 (four) hours as needed for Pain.     amiodarone (PACERONE) 200 MG Tab Take 200 mg by mouth once daily.    carvediloL (COREG) 3.125 MG tablet Take 1 tablet (3.125 mg total) by mouth 2 (two) times daily.    donepezil (ARICEPT) 10 MG tablet TAKE ONE TABLET BY MOUTH EVERY EVENING (Patient taking differently: Take 10 mg by mouth every evening. )    glipiZIDE (GLUCOTROL) 5 MG TR24 TAKE ONE TABLET BY MOUTH ONCE A DAY WITH BREAKFAST (Patient taking differently: Take 5 mg by mouth daily with breakfast. )    levETIRAcetam (KEPPRA) 500 MG Tab Take 1 tablet (500 mg total) by mouth 2 (two) times daily.    levothyroxine (SYNTHROID) 50 MCG tablet Take 50 mcg by mouth once daily.    magnesium oxide (MAG-OX) 400 mg tablet Take 400 mg by mouth 2 (two) times daily.     melatonin 3 mg Tab Take 1 capsule by mouth every evening. 3 mg daily    metformin (GLUCOPHAGE) 1000 MG tablet TAKE ONE TABLET BY MOUTH TWICE DAILY  WITH MEALS (Patient taking differently: Take 1,000 mg by mouth 2 (two) times daily with meals. )    NITROSTAT 0.4 mg SL tablet Place 1 tablet under the tongue as needed for Chest pain.     omeprazole (PRILOSEC) 40 MG capsule Take 1 capsule (40 mg total) by mouth every morning.    ondansetron (ZOFRAN) 4 MG tablet Take 4 mg by mouth every 8 (eight) hours as needed for Nausea.    pravastatin (PRAVACHOL) 40 MG tablet TAKE ONE TABLET BY MOUTH ONCE A DAY (Patient taking differently: Take 40 mg by mouth every evening. )    rivaroxaban (XARELTO) 15 mg Tab Take 1 tablet (15 mg total) by mouth daily with dinner or evening meal.    sacubitriL-valsartan (ENTRESTO) 24-26 mg per tablet Take 1 tablet by mouth 2 (two) times daily.     Family History       Problem Relation (Age of Onset)    Hypertension Father          Tobacco Use    Smoking status: Never Smoker    Smokeless tobacco: Never Used   Substance and Sexual Activity    Alcohol use: No    Drug use: No    Sexual activity: Not on file     Review of Systems   Constitutional:  Negative for chills and fever.   HENT:  Negative for congestion, ear pain, postnasal drip, rhinorrhea, sore throat and trouble swallowing.    Eyes:  Negative for redness and itching.   Respiratory:  Positive for cough. Negative for shortness of breath and wheezing.    Cardiovascular:  Negative for chest pain and palpitations.   Gastrointestinal:  Negative for abdominal pain, diarrhea, nausea and vomiting.   Genitourinary:  Negative for dysuria and frequency.   Skin:  Negative for rash.   Neurological:  Negative for weakness and headaches.   Objective:     Vital Signs (Most Recent):  Temp: 97.7 °F (36.5 °C) (05/05/22 1128)  Pulse: 77 (05/05/22 1200)  Resp: 18 (05/05/22 1128)  BP: (!) 123/59 (05/05/22 1128)  SpO2: 96 % (05/05/22 1128) Vital Signs (24h Range):  Temp:  [96.5 °F (35.8 °C)-97.7 °F (36.5 °C)] 97.7 °F (36.5 °C)  Pulse:  [50-95] 77  Resp:  [] 18  SpO2:  [93 %-100 %] 96 %  BP:  (108-129)/(56-81) 123/59     Weight: 69.9 kg (154 lb)  Body mass index is 24.86 kg/m².    Physical Exam  Vitals and nursing note reviewed.   Constitutional:       General: He is not in acute distress.     Appearance: He is well-developed.   HENT:      Head: Normocephalic and atraumatic.      Nose:      Comments: 2L NC in place  Eyes:      Conjunctiva/sclera: Conjunctivae normal.      Pupils: Pupils are equal, round, and reactive to light.   Neck:      Thyroid: No thyromegaly.   Cardiovascular:      Rate and Rhythm: Normal rate and regular rhythm.      Heart sounds: Murmur heard.   Pulmonary:      Effort: Pulmonary effort is normal. No respiratory distress.      Breath sounds: Normal breath sounds. No wheezing.   Abdominal:      General: Bowel sounds are normal.      Palpations: Abdomen is soft.      Tenderness: There is no abdominal tenderness.   Musculoskeletal:         General: Normal range of motion.      Cervical back: Normal range of motion and neck supple.      Comments: Right arm weakness,  Bilatearl upper extremity resting tremor   Lymphadenopathy:      Cervical: No cervical adenopathy.   Skin:     General: Skin is warm and dry.      Findings: No rash.   Neurological:      Mental Status: He is alert and oriented to person, place, and time.   Psychiatric:         Behavior: Behavior normal.         CRANIAL NERVES     CN III, IV, VI   Pupils are equal, round, and reactive to light.     Significant Labs: All pertinent labs within the past 24 hours have been reviewed.  A1C: No results for input(s): HGBA1C in the last 4320 hours.  ABGs:   Recent Labs   Lab 05/04/22 1946 05/04/22 2040   PH 7.400 7.410   PCO2 40 39   HCO3 24.80 24.70   POCSATURATED 88.2* 94.8*   BE 0.00 0.10   TOTALHB 10.7* 10.8*   COHB 0.6 0.5   METHB 0.2 0.1   PO2 54* 75*     Bilirubin:   Recent Labs   Lab 05/04/22 1937 05/05/22  0549   BILITOT 0.3 0.4     CBC:   Recent Labs   Lab 05/04/22 1937 05/05/22  0549   WBC 11.13 11.01   HGB 11.5*  11.7*   HCT 36.9* 37.6*    307     CMP:   Recent Labs   Lab 05/04/22 1937 05/05/22  0549    139   K 3.9 4.6    103   CO2 22* 23   * 51*   BUN 22 20   CREATININE 1.1 0.8   CALCIUM 8.5* 8.6*   PROT 6.1 6.0   ALBUMIN 2.5* 2.4*   BILITOT 0.3 0.4   ALKPHOS 84 81   AST 16 14   ALT 15 15   ANIONGAP 14 13   EGFRNONAA >60 >60     Cardiac Markers:   Recent Labs   Lab 05/04/22 1937   BNP 76     Lactic Acid: No results for input(s): LACTATE in the last 48 hours.  Lipase: No results for input(s): LIPASE in the last 48 hours.  Lipid Panel: No results for input(s): CHOL, HDL, LDLCALC, TRIG, CHOLHDL in the last 48 hours.  Magnesium:   Recent Labs   Lab 05/04/22 1937   MG 1.8     POCT Glucose:   Recent Labs   Lab 05/05/22  0619 05/05/22  0635 05/05/22  1057   POCTGLUCOSE 60* 100 300*     Troponin:   Recent Labs   Lab 05/04/22 1937   TROPONINI <0.006     TSH: No results for input(s): TSH in the last 4320 hours.  Urine Culture: No results for input(s): LABURIN in the last 48 hours.  Urine Studies:   Recent Labs   Lab 05/04/22 2029   COLORU Yellow   APPEARANCEUA Clear   PHUR 6.0   SPECGRAV >=1.030*   PROTEINUA Negative   GLUCUA Trace*   KETONESU Negative   BILIRUBINUA Negative   OCCULTUA Negative   NITRITE Negative   UROBILINOGEN Negative   LEUKOCYTESUR Negative       Significant Imaging: I have reviewed and interpreted all pertinent imaging results/findings within the past 24 hours.    CTA of chest-     No evidence of pulmonary embolism  2. Bilateral perihilar and bibasilar ground-glass and patchy infiltrates.  Findings could represent pneumonitis.  Mild edema is not excluded and follow-up is recommended.  3. Cardiomegaly with coronary atherosclerosis.  4. Nondistention of the stomach.  Gastritis is not excluded.    CXR-   The lungs are clear, with normal appearance of pulmonary vasculature and no pleural effusion or pneumothorax.     The cardiac silhouette is normal in size. The hilar and mediastinal  contours are unremarkable.

## 2022-05-05 NOTE — PROGRESS NOTES
Pharmacist Renal Dose Adjustment Note    Mao Morse is a 82 y.o. male being treated with the medication Levaquin    Patient Data:    Vital Signs (Most Recent):  Temp: 97.1 °F (36.2 °C) (05/05/22 0723)  Pulse: 74 (05/05/22 0800)  Resp: 16 (05/05/22 0723)  BP: 125/81 (05/05/22 0723)  SpO2: 97 % (05/05/22 0723) Vital Signs (72h Range):  Temp:  [96.5 °F (35.8 °C)-97.6 °F (36.4 °C)]   Pulse:  [50-95]   Resp:  []   BP: (108-129)/(56-81)   SpO2:  [93 %-100 %]      Recent Labs   Lab 05/04/22 1937 05/05/22  0549   CREATININE 1.1 0.8     Serum creatinine: 0.8 mg/dL 05/05/22 0549  Estimated creatinine clearance: 63.1 mL/min    Medication:Levaquin dose: 750mg frequency Q48 will be changed to medication:Levaquin dose:750mg frequency:Q24    Pharmacist's Name: Sharmaine Levy  Pharmacist's Extension: [phone]

## 2022-05-05 NOTE — ED NOTES
Bed: ED 05  Expected date:   Expected time:   Means of arrival:   Comments:  EMS- low [facility]

## 2022-05-05 NOTE — NURSING
Awake in bed no distress noted drank orange juice and boost for hypoglycemia tolerating po liquid intake will recheck blood glucose.

## 2022-05-05 NOTE — PLAN OF CARE
Mr Morse is here with pneumonia and plans to return to the [facility] when stable. The nursing home will accommodate his post acute care needs.
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
## 2022-05-05 NOTE — ASSESSMENT & PLAN NOTE
Questionable aspiration; pt reports he always chokes on OJ because it is 'too strong.' He doesn't choke on food and has never had issues with feeds.   Possible with AMS due to hypoglycemia leading to not protecting his airway - his sugar was less than 30 afterall.  He is DNR with comfort measures only ; continue feedings as tolerated. No need for SS

## 2022-05-05 NOTE — ED PROVIDER NOTES
[facility]                                                  Chief Complaint     Chief Complaint   Patient presents with    Hypoglycemia     [facility] staff/ems reports pt became altered and when his blood sugar was checked it was 26 intially. [facility] home staff tried oral glucose and called EMS. When ems arrived on scene pt's BG was 34 and iv D5 was given. Pt responded well and now is back at baseline.        History of Present Illness  82 y.o. male with past medical history of atrial fibrillation, dementia, coronary artery disease, COPD, diabetes mellitus, GERD, hypertension, hyperlipidemia and MI who presents with hypoglycemia.  While at his nursing home just prior to arrival, he was found to be pale and unresponsive with a blood glucose of 26. He was given orange juice and glucose done by the nursing home with improvement in his CBG to 34. Upon arrival by EMS, he was given 1 amp of D50 with improvement in blood glucose to the 152.  He was also noted to be hypoxic with O2 sat 85% on room air and was subsequently placed on 3 L with improvement in O2 sats to 95%.  Additionally, he has had decreased p.o. intake, stating that he does has not felt like eating.  Endorses nonbloody diarrhea over the past 4 days.  He is on a sulfonylurea (glipizide).  He denies any complaints.  Denies fever, chills, nausea, vomiting, chest pain, shortness of breath.    History obtained from:  EMS, nursing home    Review of patient's allergies indicates:  No Known Allergies  Past Medical History:   Diagnosis Date    Alzheimer disease     Anticoagulant long-term use     Atrial fibrillation     CAD (coronary artery disease)     COPD (chronic obstructive pulmonary disease)     Dementia     Diabetes mellitus type II     GERD (gastroesophageal reflux disease)     Hyperlipidemia     Hypertension     Hypokalemia     Hypomagnesemia     Insomnia     MI (myocardial infarction)     x 2    Mitral valve disorder     Thyroid  disease      Past Surgical History:   Procedure Laterality Date    COLONOSCOPY N/A 2/21/2021    Procedure: COLONOSCOPY;  Surgeon: Deepak Valente MD;  [facility];  Service: Endoscopy;  Laterality: N/A;    CORONARY STENT PLACEMENT      KNEE SURGERY      left    SPINE SURGERY      c-spine      Family History   Problem Relation Age of Onset    Hypertension Father      Social History     Tobacco Use    Smoking status: Never Smoker    Smokeless tobacco: Never Used   Substance Use Topics    Alcohol use: No    Drug use: No          Review of Systems and Physical Exam     Review of Systems      + Diarrhea, decreased p.o. intake    All other symptoms negative as stated below    --Constitutional - Denies fever, appetite change, chills  --Eyes - Denies eye discharge, eye pain, eye redness or visual disturbance  --HENT- Denies congestion, sore throat, drooling, ear discharge, rhinorrhea or trouble swallowing  --Respiratory - Denies cough, shortness of breath, wheezing  --Cardiovascular - Denies chest pain, leg swelling, palpitations  --Gastrointestinal - Denies abdominal pain, abdominal distension, constipation, nausea or vomiting  --Genitourinary - Denies difficulty urinating, dysuria, hematuria, urgency  --Musculoskeletal - Denies arthralgias, myalgias  --Neurological - Denies dizziness, headaches, lightheadedness, weakness  --Hematologic - Denies easy bruising or easy bleeding  --Skin - Denies rash, wound or pallor  --Psychiatric- Denies dysphoric mood, nervousness/anxiety    Vital Signs   weight is 69.9 kg (154 lb). His tympanic temperature is 96.5 °F (35.8 °C). His blood pressure is 108/56 (abnormal) and his pulse is 50 (abnormal). His respiration is 24 (abnormal) and oxygen saturation is 100%.      Physical Exam   Nursing note and vitals reviewed  --Constitutional:  Well developed, well nourished. In no acute distress.  --HENT: Normocephalic, atraumatic. No rhinorrhea.  Dry oral mucosa. No  oropharyngeal edema, erythema or exudates.   --Eyes: PERRL. Extraocular movements intact. No periorbital swelling. Normal conjunctiva.  --Neck: Normal range of motion. Neck supple. No adenopathy  --Cardiac: Regular rhythm, normal S1, normal S2, no murmur, bradycardic, intact distal pulses  --Pulmonary: Normal respiratory effort, breath sounds normal and equal bilaterally, no accessory muscle use, no respiratory distress  --Abdominal: Soft, normal bowel sounds, no tenderness, no guarding, no rebound  --Musculoskeletal: Normal range of motion. No deformity, tenderness or edema.  --Neurological:  Alert and oriented to person, year and president. Follows commands appropriately.    --Skin: Warm and dry. No rash, pallor, cyanosis or jaundice.  --Psych: Normal mood    ED Course   Critical Care    Date/Time: 5/5/2022 12:05 AM  Performed by: Travis Washburn MD  Authorized by: Travis Washburn MD   Direct patient critical care time: 9 minutes  Additional history critical care time: 8 minutes  Ordering / reviewing critical care time: 9 minutes  Documentation critical care time: 8 minutes  Consulting other physicians critical care time: 9 minutes  Total critical care time (exclusive of procedural time) : 43 minutes  Critical care time was exclusive of separately billable procedures and treating other patients.  Critical care was necessary to treat or prevent imminent or life-threatening deterioration of the following conditions: respiratory failure.  Critical care was time spent personally by me on the following activities: blood draw for specimens, development of treatment plan with patient or surrogate, discussions with consultants, evaluation of patient's response to treatment, review of old charts, re-evaluation of patient's condition, pulse oximetry, ordering and review of radiographic studies, ordering and review of laboratory studies, ordering and performing treatments and interventions, obtaining history from  patient or surrogate and examination of patient.          EKG (interpreted by me)  Rate: 57 bpm  Rhythm:  Sinus bradycardia with first-degree AV block  Axis:  Left axis deviation  ST-segments:  No elevation or depression  Overall Interpretation:  Sinus bradycardia with first-degree AV block and left axis deviation with no signs of ischemia or infarction    Lab Results (reviewed by me)  Labs Reviewed   URINALYSIS, REFLEX TO URINE CULTURE - Abnormal; Notable for the following components:       Result Value    Specific Gravity, UA >=1.030 (*)     Glucose, UA Trace (*)     All other components within normal limits    Narrative:     Specimen Source->Urine   COMPREHENSIVE METABOLIC PANEL - Abnormal; Notable for the following components:    CO2 22 (*)     Glucose 179 (*)     Calcium 8.5 (*)     Albumin 2.5 (*)     All other components within normal limits   CBC W/ AUTO DIFFERENTIAL - Abnormal; Notable for the following components:    RBC 4.14 (*)     Hemoglobin 11.5 (*)     Hematocrit 36.9 (*)     MCHC 31.2 (*)     RDW 15.2 (*)     Gran # (ANC) 9.4 (*)     Gran % 84.6 (*)     Lymph % 8.7 (*)     All other components within normal limits   D DIMER, QUANTITATIVE - Abnormal; Notable for the following components:    D-Dimer 0.78 (*)     All other components within normal limits   POCT GLUCOSE - Abnormal; Notable for the following components:    POCT Glucose 202 (*)     All other components within normal limits   INFLUENZA A & B BY MOLECULAR   TROPONIN I   SARS-COV-2 RNA AMPLIFICATION, QUAL   B-TYPE NATRIURETIC PEPTIDE   MAGNESIUM   POCT GLUCOSE MONITORING CONTINUOUS   POCT GLUCOSE MONITORING CONTINUOUS       Radiology (images visualized & reports reviewed by me)  CTA Chest Non-Coronary(PE Studies)   Final Result      1. No evidence of pulmonary embolism   2. Bilateral perihilar and bibasilar ground-glass and patchy infiltrates.  Findings could represent pneumonitis.  Mild edema is not excluded and follow-up is recommended.    3. Cardiomegaly with coronary atherosclerosis.   4. Nondistention of the stomach.  Gastritis is not excluded.         Electronically signed by: Behzad Porras   Date:    05/04/2022   Time:    22:46      X-Ray Chest 1 View   Final Result      No acute abnormality.         Electronically signed by: Behzad Porras   Date:    05/04/2022   Time:    19:43          Medications Given  Medications   levoFLOXacin 750 mg/150 mL IVPB 750 mg (has no administration in time range)   metronidazole IVPB 500 mg (has no administration in time range)   magnesium sulfate 2g in water 50mL IVPB (premix) (0 g Intravenous Stopped 5/4/22 2307)   iohexoL (OMNIPAQUE 350) injection 75 mL (75 mLs Intravenous Given 5/4/22 2202)       Differential Diagnosis:  Sulfonylurea overdose, sepsis, hepatic failure, renal failure, adrenal insufficiency, MI    Clinical Tests:  Lab Tests: Ordered and reviewed  Radiological Study: Ordered and reviewed  Medical Tests: Ordered and reviewed    ED Management     weight is 69.9 kg (154 lb). His tympanic temperature is 96.5 °F (35.8 °C). His blood pressure is 108/56 (abnormal) and his pulse is 50 (abnormal). His respiration is 24 (abnormal) and oxygen saturation is 100%.     Patient has normal O2 saturation on room air.  Physical exam with dry oral mucosa.  Labs revealed hypoxemia on ABG (PO2 54), elevated d-dimer and hyperglycemia.  EKG revealed sinus bradycardia with first-degree AV block and left axis deviation with no signs of ischemia or infarction.  Chest x-ray revealed no acute finding.  CT PE study obtained due to elevated D-dimer and revealed no evidence of PE but did show bibasilar ground-glass opacity.  Magnesium given for prolonged QTC on EKG.  Hyperglycemia likely secondary to decreased p.o. intake and diarrhea along with glipizide.  Levaquin Flagyl given for likely aspiration pneumonia.  Patient was admitted to hospital medicine.    Diagnosis  The primary encounter diagnosis was Aspiration pneumonia  of both lower lobes, unspecified aspiration pneumonia type. Diagnoses of Elevated d-dimer, Prolonged Q-T interval on ECG, Hypoxemia, and Hypoglycemia were also pertinent to this visit.    Disposition and Plan  Condition: Stable  Disposition: Admit to Hospital Medicine       Travis Washburn MD  05/05/22 0007

## 2022-05-05 NOTE — ASSESSMENT & PLAN NOTE
Takes glucotrol 5mg daily & metformin - hold (should d/c when he leaves)  BS 51 this am ; repeat BS currently 300  Add correction scale and monitor
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
## 2022-05-05 NOTE — ASSESSMENT & PLAN NOTE
ADA diet,   Correction scale.  Holding JONES - needs to d/c from hypoglycemia standpoint.    Low sugar has resolved.

## 2022-05-05 NOTE — PLAN OF CARE
[facility] - Med Surg (3rd Fl)  Initial Discharge Assessment       Primary Care Provider: Elsa Unger NP    Admission Diagnosis: Hypoxemia [R09.02]  Hypoglycemia [E16.2]  Prolonged Q-T interval on ECG [R94.31]  Elevated d-dimer [R79.89]  Aspiration pneumonia of both lower lobes, unspecified aspiration pneumonia type [J69.0]    Admission Date: 5/4/2022  Expected Discharge Date:     Discharge Barriers Identified: None    Payor: MEDICARE / Plan: MEDICARE A ONLY / Product Type: Government /     Extended Emergency Contact Information  Primary Emergency Contact: Marjan Guerrero  Address: [address] [place] [address] [id number] [place] [address]  Home Phone: [phone]  Mobile Phone: [phone]  Relation: Daughter    Discharge Plan A: Return to nursing home         [facility] [address] [address] [address]  Phone: [phone] Fax: [phone]      Initial Assessment (most recent)     Adult Discharge Assessment - 05/05/22 1124        Discharge Assessment    Assessment Type Discharge Planning Assessment     Confirmed/corrected address, phone number and insurance Yes     Confirmed Demographics Correct on Facesheet     Source of Information patient     Communicated DARCY with patient/caregiver Date not available/Unable to determine     Reason For Admission Hypoglycemia     Lives With facility resident     Facility Arrived From: [facility]     Do you expect to return to your current living situation? Yes     Do you have help at home or someone to help you manage your care at home? Yes     Who are your caregiver(s) and their phone number(s)? Marjan Guerrero (Daughter) [phone]     Prior to hospitilization cognitive status: Alert/Oriented     Current cognitive status: Alert/Oriented     Walking or Climbing Stairs Difficulty ambulation difficulty, dependent;transferring difficulty, assistance 1 person     Home Accessibility wheelchair accessible     Home Layout Able  to live on 1st floor     Equipment Currently Used at Home hospital bed;glucometer     Readmission within 30 days? No     Patient currently being followed by outpatient case management? No     Do you take prescription medications? Yes     Do you have prescription coverage? Yes     Coverage BCBS     Do you have any problems affording any of your prescribed medications? No     Are you on dialysis? No     Do you take coumadin? No     Discharge Plan A Return to nursing home     DME Needed Upon Discharge  none     Discharge Plan discussed with: Patient     Discharge Barriers Identified None                 Discharge assessment completed with patient. Patient is a resident of [facility] with plans to return there upon discharge. Per patient, he is essentially bedbound at this time. SW to remain available should any discharge needs arise.

## 2022-05-05 NOTE — NURSING
Resting in bed no distress noted call light within reach bed in low position bed alarm in place.   Report given to oncoming nurse.

## 2022-05-06 VITALS
HEART RATE: 61 BPM | HEIGHT: 66 IN | RESPIRATION RATE: 16 BRPM | DIASTOLIC BLOOD PRESSURE: 59 MMHG | WEIGHT: 154 LBS | TEMPERATURE: 97 F | OXYGEN SATURATION: 90 % | BODY MASS INDEX: 24.75 KG/M2 | SYSTOLIC BLOOD PRESSURE: 122 MMHG

## 2022-05-06 LAB
POCT GLUCOSE: 180 MG/DL (ref 70–110)
POCT GLUCOSE: 83 MG/DL (ref 70–110)

## 2022-05-06 PROCEDURE — 94760 N-INVAS EAR/PLS OXIMETRY 1: CPT

## 2022-05-06 PROCEDURE — 99233 SBSQ HOSP IP/OBS HIGH 50: CPT | Mod: ,,, | Performed by: FAMILY MEDICINE

## 2022-05-06 PROCEDURE — 27000221 HC OXYGEN, UP TO 24 HOURS

## 2022-05-06 PROCEDURE — 63600175 PHARM REV CODE 636 W HCPCS: Performed by: FAMILY MEDICINE

## 2022-05-06 PROCEDURE — 25000003 PHARM REV CODE 250: Performed by: STUDENT IN AN ORGANIZED HEALTH CARE EDUCATION/TRAINING PROGRAM

## 2022-05-06 PROCEDURE — 25000003 PHARM REV CODE 250: Performed by: FAMILY MEDICINE

## 2022-05-06 PROCEDURE — 99233 PR SUBSEQUENT HOSPITAL CARE,LEVL III: ICD-10-PCS | Mod: ,,, | Performed by: FAMILY MEDICINE

## 2022-05-06 PROCEDURE — G0378 HOSPITAL OBSERVATION PER HR: HCPCS

## 2022-05-06 RX ORDER — LEVOFLOXACIN 500 MG/1
500 TABLET, FILM COATED ORAL DAILY
Qty: 5 TABLET | Refills: 0 | Status: SHIPPED | OUTPATIENT
Start: 2022-05-06

## 2022-05-06 RX ORDER — ACETAMINOPHEN 325 MG/1
650 TABLET ORAL EVERY 6 HOURS PRN
Status: DISCONTINUED | OUTPATIENT
Start: 2022-05-06 | End: 2022-05-06 | Stop reason: HOSPADM

## 2022-05-06 RX ADMIN — LEVETIRACETAM 500 MG: 500 TABLET, FILM COATED ORAL at 08:05

## 2022-05-06 RX ADMIN — LEVOTHYROXINE SODIUM 50 MCG: 50 TABLET ORAL at 05:05

## 2022-05-06 RX ADMIN — CARVEDILOL 3.12 MG: 3.12 TABLET, FILM COATED ORAL at 08:05

## 2022-05-06 RX ADMIN — PANTOPRAZOLE SODIUM 40 MG: 40 TABLET, DELAYED RELEASE ORAL at 08:05

## 2022-05-06 RX ADMIN — ACETAMINOPHEN 650 MG: 325 TABLET ORAL at 12:05
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
## 2022-05-06 NOTE — ASSESSMENT & PLAN NOTE
Stop glucotrol 5mg daily & metformin   BS 51 this am ; repeat BS currently 300  Monitor BS at home

## 2022-05-06 NOTE — ASSESSMENT & PLAN NOTE
ADA diet,   Correction scale.  Holding JONES - needs to d/c from hypoglycemia standpoint.    Low sugar has resolved.   5/6 glucose, 100, 142, 83   Lab Results   Component Value Date    HGBA1C 4.5 05/05/2022     Stop metformin, glyburide

## 2022-05-06 NOTE — ASSESSMENT & PLAN NOTE
Stop glucotrol 5mg daily & metformin   BS 51 this am ; repeat BS currently 300  Monitor BS at home    Lab Results   Component Value Date    HGBA1C 4.5 05/05/2022

## 2022-05-06 NOTE — SUBJECTIVE & OBJECTIVE
Past Medical History:   Diagnosis Date    Alzheimer disease     Anticoagulant long-term use     Atrial fibrillation     CAD (coronary artery disease)     COPD (chronic obstructive pulmonary disease)     Dementia     Diabetes mellitus type II     GERD (gastroesophageal reflux disease)     Hyperlipidemia     Hypertension     Hypokalemia     Hypomagnesemia     Insomnia     MI (myocardial infarction)     x 2    Mitral valve disorder     Thyroid disease        Past Surgical History:   Procedure Laterality Date    COLONOSCOPY N/A 2/21/2021    Procedure: COLONOSCOPY;  Surgeon: Deepak Valente MD;  Location: [facility];  Service: Endoscopy;  Laterality: N/A;    CORONARY STENT PLACEMENT      KNEE SURGERY      left    SPINE SURGERY      c-spine       Review of patient's allergies indicates:  No Known Allergies    No current facility-administered medications on file prior to encounter.     Current Outpatient Medications on File Prior to Encounter   Medication Sig    acetaminophen (TYLENOL) 325 MG tablet Take 325 mg by mouth every 4 (four) hours as needed for Pain.     amiodarone (PACERONE) 200 MG Tab Take 200 mg by mouth once daily.    carvediloL (COREG) 3.125 MG tablet Take 1 tablet (3.125 mg total) by mouth 2 (two) times daily.    donepezil (ARICEPT) 10 MG tablet TAKE ONE TABLET BY MOUTH EVERY EVENING (Patient taking differently: Take 10 mg by mouth every evening. )    glipiZIDE (GLUCOTROL) 5 MG TR24 TAKE ONE TABLET BY MOUTH ONCE A DAY WITH BREAKFAST (Patient taking differently: Take 5 mg by mouth daily with breakfast. )    levETIRAcetam (KEPPRA) 500 MG Tab Take 1 tablet (500 mg total) by mouth 2 (two) times daily.    levothyroxine (SYNTHROID) 50 MCG tablet Take 50 mcg by mouth once daily.    magnesium oxide (MAG-OX) 400 mg tablet Take 400 mg by mouth 2 (two) times daily.     melatonin 3 mg Tab Take 1 capsule by mouth every evening. 3 mg daily    metformin (GLUCOPHAGE) 1000 MG tablet TAKE ONE TABLET BY MOUTH TWICE DAILY  WITH MEALS (Patient taking differently: Take 1,000 mg by mouth 2 (two) times daily with meals. )    NITROSTAT 0.4 mg SL tablet Place 1 tablet under the tongue as needed for Chest pain.     omeprazole (PRILOSEC) 40 MG capsule Take 1 capsule (40 mg total) by mouth every morning.    ondansetron (ZOFRAN) 4 MG tablet Take 4 mg by mouth every 8 (eight) hours as needed for Nausea.    pravastatin (PRAVACHOL) 40 MG tablet TAKE ONE TABLET BY MOUTH ONCE A DAY (Patient taking differently: Take 40 mg by mouth every evening. )    rivaroxaban (XARELTO) 15 mg Tab Take 1 tablet (15 mg total) by mouth daily with dinner or evening meal.    sacubitriL-valsartan (ENTRESTO) 24-26 mg per tablet Take 1 tablet by mouth 2 (two) times daily.     Family History       Problem Relation (Age of Onset)    Hypertension Father          Tobacco Use    Smoking status: Never Smoker    Smokeless tobacco: Never Used   Substance and Sexual Activity    Alcohol use: No    Drug use: No    Sexual activity: Not on file     Review of Systems   Constitutional:  Negative for chills and fever.   HENT:  Negative for congestion, ear pain, postnasal drip, rhinorrhea, sore throat and trouble swallowing.    Eyes:  Negative for redness and itching.   Respiratory:  Positive for cough. Negative for shortness of breath and wheezing.    Cardiovascular:  Negative for chest pain and palpitations.   Gastrointestinal:  Negative for abdominal pain, diarrhea, nausea and vomiting.   Genitourinary:  Negative for dysuria and frequency.   Skin:  Negative for rash.   Neurological:  Negative for weakness and headaches.   Objective:     Vital Signs (Most Recent):  Temp: 97.5 °F (36.4 °C) (05/06/22 0421)  Pulse: (!) 58 (05/06/22 0600)  Resp: 18 (05/06/22 0421)  BP: (!) 111/56 (05/06/22 0421)  SpO2: 95 % (05/06/22 0421) Vital Signs (24h Range):  Temp:  [97.1 °F (36.2 °C)-98 °F (36.7 °C)] 97.5 °F (36.4 °C)  Pulse:  [58-77] 58  Resp:  [16-20] 18  SpO2:  [95 %-97 %] 95 %  BP:  (109-176)/(52-88) 111/56     Weight: 69.9 kg (154 lb)  Body mass index is 24.86 kg/m².    Physical Exam  Vitals and nursing note reviewed.   Constitutional:       General: He is not in acute distress.     Appearance: He is well-developed.   HENT:      Head: Normocephalic and atraumatic.      Nose:      Comments: 2L NC in place  Eyes:      Conjunctiva/sclera: Conjunctivae normal.      Pupils: Pupils are equal, round, and reactive to light.   Neck:      Thyroid: No thyromegaly.   Cardiovascular:      Rate and Rhythm: Normal rate and regular rhythm.      Heart sounds: Murmur heard.     No friction rub. No gallop.   Pulmonary:      Effort: Pulmonary effort is normal. No respiratory distress.      Breath sounds: Normal breath sounds. No wheezing.   Abdominal:      General: Bowel sounds are normal.      Palpations: Abdomen is soft.      Tenderness: There is no abdominal tenderness.   Musculoskeletal:         General: Normal range of motion.      Cervical back: Normal range of motion and neck supple.      Right lower leg: No edema.      Left lower leg: No edema.      Comments: Right arm weakness,  Bilatearl upper extremity resting tremor   Lymphadenopathy:      Cervical: No cervical adenopathy.   Skin:     General: Skin is warm and dry.      Findings: No rash.   Neurological:      General: No focal deficit present.      Mental Status: He is alert and oriented to person, place, and time. Mental status is at baseline.      Cranial Nerves: No cranial nerve deficit.      Motor: Weakness present.      Gait: Gait abnormal.      Comments: Unable to walk   Psychiatric:         Mood and Affect: Mood normal.         Behavior: Behavior normal.         CRANIAL NERVES     CN III, IV, VI   Pupils are equal, round, and reactive to light.     Significant Labs: All pertinent labs within the past 24 hours have been reviewed.  A1C:   Recent Labs   Lab 05/05/22  0549   HGBA1C 4.5     ABGs:   Recent Labs   Lab 05/04/22  1946 05/04/22 2040    PH 7.400 7.410   PCO2 40 39   HCO3 24.80 24.70   POCSATURATED 88.2* 94.8*   BE 0.00 0.10   TOTALHB 10.7* 10.8*   COHB 0.6 0.5   METHB 0.2 0.1   PO2 54* 75*       Bilirubin:   Recent Labs   Lab 05/04/22 1937 05/05/22  0549   BILITOT 0.3 0.4       CBC:   Recent Labs   Lab 05/04/22 1937 05/05/22  0549   WBC 11.13 11.01   HGB 11.5* 11.7*   HCT 36.9* 37.6*    307       CMP:   Recent Labs   Lab 05/04/22 1937 05/05/22  0549    139   K 3.9 4.6    103   CO2 22* 23   * 51*   BUN 22 20   CREATININE 1.1 0.8   CALCIUM 8.5* 8.6*   PROT 6.1 6.0   ALBUMIN 2.5* 2.4*   BILITOT 0.3 0.4   ALKPHOS 84 81   AST 16 14   ALT 15 15   ANIONGAP 14 13   EGFRNONAA >60 >60       Cardiac Markers:   Recent Labs   Lab 05/04/22 1937   BNP 76       Lactic Acid: No results for input(s): LACTATE in the last 48 hours.  Lipase: No results for input(s): LIPASE in the last 48 hours.  Lipid Panel: No results for input(s): CHOL, HDL, LDLCALC, TRIG, CHOLHDL in the last 48 hours.  Magnesium:   Recent Labs   Lab 05/04/22 1937   MG 1.8       POCT Glucose:   Recent Labs   Lab 05/05/22  1615 05/05/22  1949 05/06/22  0613   POCTGLUCOSE 142* 242* 83       Troponin:   Recent Labs   Lab 05/04/22 1937   TROPONINI <0.006       TSH: No results for input(s): TSH in the last 4320 hours.  Urine Culture: No results for input(s): LABURIN in the last 48 hours.  Urine Studies:   Recent Labs   Lab 05/04/22 2029   COLORU Yellow   APPEARANCEUA Clear   PHUR 6.0   SPECGRAV >=1.030*   PROTEINUA Negative   GLUCUA Trace*   KETONESU Negative   BILIRUBINUA Negative   OCCULTUA Negative   NITRITE Negative   UROBILINOGEN Negative   LEUKOCYTESUR Negative         Significant Imaging: I have reviewed and interpreted all pertinent imaging results/findings within the past 24 hours.    5/5 CXR- The lungs are under expanded with crowding of pulmonary vascularity.  No consolidation or effusions.  The heart is normal in size.  Calcified atheromatous disease affects  the aorta.      CTA of chest-     No evidence of pulmonary embolism  2. Bilateral perihilar and bibasilar ground-glass and patchy infiltrates.  Findings could represent pneumonitis.  Mild edema is not excluded and follow-up is recommended.  3. Cardiomegaly with coronary atherosclerosis.  4. Nondistention of the stomach.  Gastritis is not excluded.    CXR-   The lungs are clear, with normal appearance of pulmonary vasculature and no pleural effusion or pneumothorax.     The cardiac silhouette is normal in size. The hilar and mediastinal contours are unremarkable.
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
## 2022-05-06 NOTE — ASSESSMENT & PLAN NOTE
Questionable aspiration; pt reports he always chokes on OJ because it is 'too strong.' He doesn't choke on food and has never had issues with feeds.   Possible with AMS due to hypoglycemia leading to not protecting his airway - his sugar was less than 30 afterall.  He is DNR with comfort measures only ; continue feedings as tolerated. No need for SS  O2 sat 94% on RA  D/c back to nursing home with levofloxacin 500mg po daily x 5d

## 2022-05-06 NOTE — PLAN OF CARE
Plan of Care:  Monitor vital signs, labs, telemetry, SATs.  Maintain bed alarm with side rails x3.  Bradycardia on monitor.  Afebrile.  Repositioning every 2 hours to prevent skin breakdown.  Glycemic controls in place.  Antibiotics IVPB.  Nasal cannula oxygen at 2 liters. SATs upper 90s.

## 2022-05-06 NOTE — PROGRESS NOTES
Nursing Notes    Report received from Estella FELIPE.  Patient awake without complaints.  Bed alarm on.  Call bell in reach.    Huddle Comments

## 2022-05-06 NOTE — HOSPITAL COURSE
"5/6 83 YO WM admitted with hypoglycemia, hypoxia, qustionable aspiration pneumonia ; day 2 Levofloxacin  Afebrile , WBC normal , O2 sat 94% on RA this am   Gets up in chair at nursing home per pt but reports " I can't walk"   No further glyberide, can resume metfromin in future if needed but recommend hold for now, BS stable without RX   "

## 2024-04-25 NOTE — ASSESSMENT & PLAN NOTE
Afib + Hx of DVT, PE  Maintain xarelto , H&H stable      Take lasix as needed - 20mg only when you feel like your leg swelling is worsening  Take potassium chloride packet when you take lasix   You will need to follow up PCP in 1 week for repeat BMP

## 2025-01-04 NOTE — PROGRESS NOTES
Mao Morse 1940 Seen at Nursing home today. DNR    Standing orders:  9/1/2017 tsh 4.7 (7/2017- was 7.25 and we increase synthroid from 25 to 50mcg daily)  7/27/2017 TSH 7.25  6/2017 total chol 115, trig 71, hdl 35, ldl 66   Mag 1.5   PSA 7.24   TSH 10.76 (- added synthroid 25mcg daily)   Wbc 8, h/h 12/37, plt 211   Na 138, k 3.8, bun/creat 18/0.9, glucose 72  3/7/2017 PSA 7.35  12/6/2016 Na 136, K 4.1, glucose 89, BUN/creat 26/1.1  11/2016 wbc 8, h/h 13/42, plt 217   Na 138, K 4.3, BUN/creat 19/1.1, glucose 120   tsh 3.4   Total chol 128, trig 157, hdl 41, ldl 55   A1C 6.3   PSA 7.4    Chief complaint: Nursing home follow-up    Vitals:    12/05/17 1031   BP: 136/76   Pulse: 76   Resp: 19   Temp: 98.4 °F (36.9 °C)       Past Medical History:   Diagnosis Date    Alzheimer disease     Anticoagulant long-term use     Atrial fibrillation     CAD (coronary artery disease)     COPD (chronic obstructive pulmonary disease)     Diabetes mellitus type II     Hyperlipidemia     Hypertension     MI (myocardial infarction)     x 2    Mitral valve disorder        Past Surgical History:   Procedure Laterality Date    CORONARY STENT PLACEMENT      KNEE SURGERY      left    SPINE SURGERY      c-spine       Review of patient's allergies indicates:  No Known Allergies    Current Outpatient Prescriptions on File Prior to Visit   Medication Sig Dispense Refill    albuterol (PROVENTIL) 2.5 mg /3 mL (0.083 %) nebulizer solution Content of 1 vial per neb tx every 4 hours PRN SOB/ coughing (give with ipratropium)  99    amiodarone (PACERONE) 200 MG Tab Take 200 mg by mouth once daily.      amlodipine (NORVASC) 5 MG tablet Take 1 tablet (5 mg total) by mouth once daily. 90 tablet 0    carvedilol (COREG) 12.5 MG tablet Take 12.5 mg by mouth 2 (two) times daily. Notify MD if heart rate below 54      donepezil (ARICEPT) 10 MG tablet TAKE ONE TABLET BY MOUTH EVERY EVENING 90 tablet 0    glipiZIDE (GLUCOTROL) 5 MG TR24 TAKE  ONE TABLET BY MOUTH ONCE A DAY WITH BREAKFAST 90 tablet 0    hydrochlorothiazide (HYDRODIURIL) 25 MG tablet Take 1 tablet (25 mg total) by mouth once daily. 90 tablet 0    ipratropium (ATROVENT) 0.02 % nebulizer solution Content of 1 vial per neb tx every 4 hours PRN SOB/ coughing (give with albuterol)  99    levothyroxine (SYNTHROID) 50 MCG tablet Take 50 mcg by mouth once daily.      magnesium oxide (MAG-OX) 400 mg tablet Take 400 mg by mouth once daily.      MELATONIN ORAL Take 1 capsule by mouth every evening. 3 mg daily      metformin (GLUCOPHAGE) 1000 MG tablet TAKE ONE TABLET BY MOUTH TWICE DAILY WITH MEALS 180 tablet 0    NITROSTAT 0.4 mg SL tablet Place 1 tablet under the tongue as needed.      omeprazole (PRILOSEC) 40 MG capsule Take 1 capsule (40 mg total) by mouth every morning. 90 capsule 0    potassium chloride SA (K-DUR,KLOR-CON) 20 MEQ tablet Take 1 tablet (20 mEq total) by mouth once daily. 90 tablet 1    pravastatin (PRAVACHOL) 40 MG tablet TAKE ONE TABLET BY MOUTH ONCE A DAY 90 tablet 0    rivaroxaban (XARELTO) 20 mg Tab Take 20 mg by mouth daily with dinner or evening meal.      valsartan (DIOVAN) 320 MG tablet Take 320 mg by mouth once daily.  11     No current facility-administered medications on file prior to visit.        HPI: 77 y.o. male resident of nursing home.  He is in his room. He has no complaints today except that his mind gets a little mixed up. He canceled his lunch today then ordered 2 sandwiches and couldn't remember why. He was d/kelly from PT 10/18 but restarted 11/9. He needs this to keep what little independence he has evaluated by Dr Armando for his lower ext weakness and this was rec per her. Ortho in future if needed. He also sees Dr Armando every 4 months last seen 9/11/17.     Last saw Dr Westbrook for elevated PSA 10/25 and he rec a prostate biopsy. The nurses tell me this is being scheduled.       ROS: Significant for no complaints . Pt denies any chest pain,  SOB, bowel or bladder discomfort. Pt reports eating and drinking well. Sleeping well. No complaints of pain.    PE:   APPEARANCE: Well nourished, well developed, in no acute distress.   HEAD: Normocephalic, atraumatic.  CHEST: Lungs clear to auscultation.  CARDIOVASCULAR: irreg/irreg.  ABDOMEN: Bowel sounds normal. Not distended. Soft. No tenderness or masses.  MUSCULOSKELETAL: Unremarkable. No edema  SKIN: Warm and dry.      DX:  1. Uncontrolled type 2 diabetes mellitus without complication, without long-term current use of insulin     2. Benign essential HTN     3. Hyperlipidemia with target LDL less than 70     4. Chronic obstructive pulmonary disease, unspecified COPD type     5. Anxiety     6. Tremors of nervous system     7. ASCVD (arteriosclerotic cardiovascular disease)     8. Subclinical hypothyroidism     9. Atrial fibrillation, unspecified type       Past Medical History:   Diagnosis Date    Alzheimer disease     Anticoagulant long-term use     Atrial fibrillation     CAD (coronary artery disease)     COPD (chronic obstructive pulmonary disease)     Diabetes mellitus type II     Hyperlipidemia     Hypertension     MI (myocardial infarction)     x 2    Mitral valve disorder        Medical decision making and plan:  F/u  Dr Westbrook for prostate bx  F/u [facility] every 6 month  F/u Dr Armando per Dr Woods rec for bilateral lower ext weakness; Cont with PT  Also Increased synthroid to 50mcg daily and repeat tsh WNL cont current dose of 50mcg daily    Cont same other meds and orders       Patient has c/o elevated blood pressure. Patient is asymptomatic and states he has been compliant with his medication. Patient is scheduled for cataract surgery on Monday.Type 2 DM: [id number]